# Patient Record
Sex: FEMALE | Race: WHITE | NOT HISPANIC OR LATINO | Employment: UNEMPLOYED | URBAN - METROPOLITAN AREA
[De-identification: names, ages, dates, MRNs, and addresses within clinical notes are randomized per-mention and may not be internally consistent; named-entity substitution may affect disease eponyms.]

---

## 2024-03-01 ENCOUNTER — APPOINTMENT (OUTPATIENT)
Dept: GENERAL RADIOLOGY | Facility: HOSPITAL | Age: 59
End: 2024-03-01
Payer: COMMERCIAL

## 2024-03-01 ENCOUNTER — HOSPITAL ENCOUNTER (EMERGENCY)
Facility: HOSPITAL | Age: 59
Discharge: HOME | End: 2024-03-01
Attending: EMERGENCY MEDICINE
Payer: COMMERCIAL

## 2024-03-01 VITALS
SYSTOLIC BLOOD PRESSURE: 125 MMHG | RESPIRATION RATE: 26 BRPM | HEIGHT: 66 IN | DIASTOLIC BLOOD PRESSURE: 82 MMHG | TEMPERATURE: 98.2 F | HEART RATE: 70 BPM | WEIGHT: 138 LBS | BODY MASS INDEX: 22.18 KG/M2 | OXYGEN SATURATION: 100 %

## 2024-03-01 DIAGNOSIS — S70.02XA CONTUSION OF LEFT HIP, INITIAL ENCOUNTER: ICD-10-CM

## 2024-03-01 DIAGNOSIS — S52.602A CLOSED FRACTURE DISTAL RADIUS AND ULNA, LEFT, INITIAL ENCOUNTER: Primary | ICD-10-CM

## 2024-03-01 DIAGNOSIS — S52.502A CLOSED FRACTURE DISTAL RADIUS AND ULNA, LEFT, INITIAL ENCOUNTER: Primary | ICD-10-CM

## 2024-03-01 PROCEDURE — 73110 X-RAY EXAM OF WRIST: CPT

## 2024-03-01 PROCEDURE — 73080 X-RAY EXAM OF ELBOW: CPT

## 2024-03-01 PROCEDURE — 73502 X-RAY EXAM HIP UNI 2-3 VIEWS: CPT

## 2024-03-01 PROCEDURE — 99285 EMERGENCY DEPT VISIT HI MDM: CPT

## 2024-03-01 PROCEDURE — 25010000002 PROPOFOL 10 MG/ML EMULSION: Performed by: EMERGENCY MEDICINE

## 2024-03-01 RX ORDER — HYDROCODONE BITARTRATE AND ACETAMINOPHEN 5; 325 MG/1; MG/1
1 TABLET ORAL ONCE
Status: COMPLETED | OUTPATIENT
Start: 2024-03-01 | End: 2024-03-01

## 2024-03-01 RX ORDER — PROPOFOL 10 MG/ML
150 VIAL (ML) INTRAVENOUS ONCE
Status: DISCONTINUED | OUTPATIENT
Start: 2024-03-01 | End: 2024-03-02 | Stop reason: HOSPADM

## 2024-03-01 RX ORDER — HYDROCODONE BITARTRATE AND ACETAMINOPHEN 5; 325 MG/1; MG/1
1 TABLET ORAL EVERY 6 HOURS PRN
Qty: 12 TABLET | Refills: 0 | Status: SHIPPED | OUTPATIENT
Start: 2024-03-01 | End: 2024-03-01 | Stop reason: SDUPTHER

## 2024-03-01 RX ORDER — PROPOFOL 10 MG/ML
VIAL (ML) INTRAVENOUS
Status: COMPLETED | OUTPATIENT
Start: 2024-03-01 | End: 2024-03-01

## 2024-03-01 RX ORDER — SODIUM CHLORIDE 0.9 % (FLUSH) 0.9 %
10 SYRINGE (ML) INJECTION AS NEEDED
Status: DISCONTINUED | OUTPATIENT
Start: 2024-03-01 | End: 2024-03-02 | Stop reason: HOSPADM

## 2024-03-01 RX ORDER — HYDROCODONE BITARTRATE AND ACETAMINOPHEN 5; 325 MG/1; MG/1
1 TABLET ORAL EVERY 6 HOURS PRN
Qty: 12 TABLET | Refills: 0 | Status: SHIPPED | OUTPATIENT
Start: 2024-03-01

## 2024-03-01 RX ADMIN — PROPOFOL 40 MG: 10 INJECTION, EMULSION INTRAVENOUS at 21:18

## 2024-03-01 RX ADMIN — HYDROCODONE BITARTRATE AND ACETAMINOPHEN 1 TABLET: 5; 325 TABLET ORAL at 22:12

## 2024-03-01 RX ADMIN — PROPOFOL 60 MG: 10 INJECTION, EMULSION INTRAVENOUS at 21:16

## 2024-03-02 NOTE — ED PROVIDER NOTES
Subjective   History of Present Illness  58-year-old female presents for evaluation of left wrist injury.  Just prior to coming to the emergency department this evening, the patient was walking her dog when she had a mechanical trip and fall and fell onto an outstretched left hand.  She felt immediate pain to her left wrist.  She suffered an obvious deformity to her left wrist as a result of the fall.  She is right-handed.  She is also complaining of mild pain to her left hip from the fall.  She is ambulatory.  No paresthesias.  She currently rates her pain in her wrist at 9 out of 10 in severity and notes that the pain is worse with attempted movement.  She notes radiation of pain to her left elbow region.  She did not hit her head or lose consciousness.  No neck pain.  She is not anticoagulated.      Review of Systems   Musculoskeletal:         Left wrist pain, left hip pain   All other systems reviewed and are negative.      No past medical history on file.    No Known Allergies    No past surgical history on file.    No family history on file.    Social History     Socioeconomic History    Marital status:            Objective   Physical Exam  Vitals and nursing note reviewed.   Constitutional:       General: She is not in acute distress.     Appearance: Normal appearance. She is well-developed. She is not diaphoretic.      Comments: Nontoxic-appearing female   HENT:      Head: Normocephalic and atraumatic.   Eyes:      Pupils: Pupils are equal, round, and reactive to light.   Neck:      Comments: No midline cervical spine tenderness noted, no step-off or deformity present  Cardiovascular:      Rate and Rhythm: Normal rate and regular rhythm.      Heart sounds: Normal heart sounds. No murmur heard.     No friction rub. No gallop.   Pulmonary:      Effort: Pulmonary effort is normal. No respiratory distress.      Breath sounds: Normal breath sounds. No wheezing or rales.   Abdominal:      General: Bowel  sounds are normal. There is no distension.      Palpations: Abdomen is soft. There is no mass.      Tenderness: There is no abdominal tenderness. There is no guarding or rebound.   Musculoskeletal:      Comments: Obvious, closed deformity noted to left wrist with soft tissue swelling noted    No shortening or rotation of left lower extremity when compared to the right on visual inspection, no pelvic instability present   Skin:     General: Skin is warm and dry.      Findings: No erythema or rash.   Neurological:      Mental Status: She is alert and oriented to person, place, and time.      Comments: Normal gait, neurovascularly intact distally in all fours with bounding distal pulses normal sensation noted   Psychiatric:         Mood and Affect: Mood normal.         Thought Content: Thought content normal.         Judgment: Judgment normal.         Procedural Sedation    Date/Time: 3/1/2024 11:44 PM    Performed by: Mark Syed MD  Authorized by: Mark Syed MD    Consent:     Consent obtained:  Verbal and written    Consent given by:  Patient    Risks discussed:  Allergic reaction, dysrhythmia, inadequate sedation, nausea, respiratory compromise necessitating ventilatory assistance and intubation, prolonged hypoxia resulting in organ damage and vomiting  Universal protocol:     Procedure explained and questions answered to patient or proxy's satisfaction: yes      Relevant documents present and verified: yes      Imaging studies available: yes      Site/side marked: yes      Immediately prior to procedure, a time out was called: yes      Patient identity confirmed:  Verbally with patient and arm band  Indications:     Procedure performed:  Fracture reduction    Procedure necessitating sedation performed by:  Physician performing sedation    Intended level of sedation:  Moderate  Pre-sedation assessment:     Time since last food or drink:  1700    ASA classification: class 2 - patient with mild  systemic disease      Mallampati score:  II - soft palate, uvula, fauces visible    Pre-sedation assessments completed and reviewed: airway patency, anesthesia/sedation history, cardiovascular function, hydration status, mental status, nausea/vomiting, pain level, respiratory function and temperature      Pre-sedation assessment completed:  3/1/2024 9:00 PM  Immediate pre-procedure details:     Reassessment: Patient reassessed immediately prior to procedure      Reviewed: vital signs, relevant labs/tests and NPO status      Verified: bag valve mask available, emergency equipment available, intubation equipment available, IV patency confirmed and oxygen available    Procedure details (see MAR for exact dosages):     Sedation start time:  3/1/2024 9:16 PM    Preoxygenation:  Nonrebreather mask    Sedation:  Propofol    Intra-procedure monitoring:  Blood pressure monitoring, cardiac monitor, continuous pulse oximetry, continuous capnometry, frequent LOC assessments and frequent vital sign checks    Intra-procedure events: none      Sedation end time:  3/1/2024 9:33 PM    Total sedation time (minutes):  17  Post-procedure details:     Post-sedation assessment completed:  3/1/2024 9:45 PM    Attendance: Constant attendance by certified staff until patient recovered      Recovery: Patient returned to pre-procedure baseline      Complications:  N/a    Post-sedation assessments completed and reviewed: airway patency, cardiovascular function, hydration status, mental status, nausea/vomiting, pain level and respiratory function      Specimens recovered:  None    Patient is stable for discharge or admission: yes      Procedure completion:  Tolerated well, no immediate complications  FX Dislocation    Date/Time: 3/1/2024 11:46 PM    Performed by: Mark Syed MD  Authorized by: Mark Syed MD    Consent:     Consent obtained:  Verbal and written    Consent given by:  Patient    Risks, benefits, and alternatives  were discussed: yes      Risks discussed:  Nerve damage, pain and vascular damage  Universal protocol:     Procedure explained and questions answered to patient or proxy's satisfaction: yes      Relevant documents present and verified: yes      Imaging studies available: yes      Site/side marked: yes      Immediately prior to procedure, a time out was called: yes      Patient identity confirmed:  Verbally with patient and arm band  Injury:     Injury location:  Forearm    Forearm injury location:  L forearm    Forearm fracture type: distal radius and ulnar styloid    Pre-procedure details:     Distal neurologic exam:  Normal    Distal perfusion: distal pulses strong and brisk capillary refill      Range of motion: reduced    Sedation:     Sedation type:  Moderate sedation  Anesthesia:     Anesthesia method:  None  Procedure details:     Manipulation performed: yes      X-ray confirmed reduction: yes      Immobilization:  Splint    Splint type:  Sugar tong    Supplies used:  Cotton padding and fiberglass    Attestation: Splint applied and adjusted personally by me    Post-procedure details:     Distal perfusion: distal pulses strong and brisk capillary refill      Range of motion: not applicable      Procedure completion:  Tolerated well, no immediate complications             ED Course  ED Course as of 03/01/24 2342   Fri Mar 01, 2024   2210 58-year-old female presents for evaluation of left wrist injury.  Just prior to coming to the emergency department this evening, the patient was walking her dog when she had a mechanical trip and fall and fell onto an outstretched left hand.  She felt immediate pain to her left wrist.  She suffered an obvious deformity to her left wrist as a result of the fall.  She is right-handed.  She is also complaining of mild pain to her left hip.  She is ambulatory.  No paresthesias.  On arrival to the ED, the patient has an obvious closed deformity to her left wrist.  Range of motion is  limited secondary to pain.  She is ambulatory.  Left upper extremity is neurovascularly intact distally with bounding distal pulses normal sensation noted.  Pain control provided. [DD]   2211 I personally and independently viewed the patient's x-ray images myself, and I am in agreement with the radiologist's reading for final interpretation--particular regarding closed distal radius and ulna fractures on the left.  No hip fracture noted. [DD]   2211 After obtaining informed consent, and using propofol for procedural sedation, the patient's wrist fracture was reduced without complication.  She tolerated the procedure well.  Neurovascularly intact following splint placement.  Post reduction x-rays obtained with improved alignment. [DD]   2212 I feel that the patient can be managed on an outpatient basis at this point.  Short course of Norco given for pain control.  I referred the patient Dr. Byrd of orthopedics [DD]   2212  and she will follow-up within the next 72 hours.  Agreeable with plan and given appropriate strict return precautions. [DD]      ED Course User Index  [DD] Mark Syed MD                                 No results found for this or any previous visit (from the past 24 hour(s)).  Note: In addition to lab results from this visit, the labs listed above may include labs taken at another facility or during a different encounter within the last 24 hours. Please correlate lab times with ED admission and discharge times for further clarification of the services performed during this visit.    XR Wrist 3+ View Left   Final Result   Impression:   Slight improved alignment of the comminuted distal radius fracture status post reduction.         Electronically Signed: Randy Comer MD     3/1/2024 9:41 PM EST     Workstation ID: [id number]      XR Elbow 3+ View Left   Final Result   Impression:   No obvious proximal fracture or elbow joint malalignment, with poor visualization given overlying splint  "material.         Electronically Signed: Randy Comer MD     3/1/2024 9:40 PM EST     Workstation ID: [id number]      XR Hip With or Without Pelvis 2 - 3 View Left   Final Result   Impression:   No acute abnormality of the left hip.         Electronically Signed: Randy Comer MD     3/1/2024 8:01 PM EST     Workstation ID: [id number]      XR Wrist 3+ View Left   Final Result   Impression:   Comminuted moderately displaced fracture of the distal radius with radiocarpal intra-articular extension as above.    Mildly displaced ulnar styloid fracture.         Electronically Signed: Randy Comer MD     3/1/2024 8:00 PM EST     Workstation ID: [id number]        Vitals:    03/01/24 1910 03/01/24 2116 03/01/24 2135   BP: 161/99 134/68 125/82   BP Location: Right arm     Patient Position: Sitting     Pulse: 79 79 70   Resp: 18 26    Temp: 98.2 °F (36.8 °C)     TempSrc: Oral     SpO2: 100% 98% 100%   Weight: 62.6 kg (138 lb)     Height: 167.6 cm (66\")       Medications   sodium chloride 0.9 % flush 10 mL (has no administration in time range)   Propofol (DIPRIVAN) injection 150 mg (has no administration in time range)   Propofol (DIPRIVAN) injection (40 mg Intravenous Given 3/1/24 2118)   HYDROcodone-acetaminophen (NORCO) 5-325 MG per tablet 1 tablet (1 tablet Oral Given 3/1/24 2212)     ECG/EMG Results (last 24 hours)       ** No results found for the last 24 hours. **          No orders to display           AMILCAR reviewed by Mark Syed MD       Medical Decision Making  Problems Addressed:  Closed fracture distal radius and ulna, left, initial encounter: complicated acute illness or injury    Amount and/or Complexity of Data Reviewed  Radiology: ordered.    Risk  Prescription drug management.        Final diagnoses:   Closed fracture distal radius and ulna, left, initial encounter       ED Disposition  ED Disposition       ED Disposition   Discharge    Condition   Stable    Comment   --               David Byrd, " MD  [address]  [place] [address]  [phone]    In 3 days           Medication List        New Prescriptions      HYDROcodone-acetaminophen 5-325 MG per tablet  Commonly known as: NORCO  Take 1 tablet by mouth Every 6 (Six) Hours As Needed for Moderate Pain.               Where to Get Your Medications        These medications were sent to [facility] - [place], [place] - [address] - [phone] [facility] [phone] FX  [address], [place] [id number]      Phone: [phone]   HYDROcodone-acetaminophen 5-325 MG per tablet            Mark Syed MD  03/01/24 8214

## 2024-03-04 ENCOUNTER — TELEPHONE (OUTPATIENT)
Age: 59
End: 2024-03-04

## 2024-03-04 ENCOUNTER — OFFICE VISIT (OUTPATIENT)
Age: 59
End: 2024-03-04
Payer: COMMERCIAL

## 2024-03-04 VITALS
WEIGHT: 138 LBS | SYSTOLIC BLOOD PRESSURE: 115 MMHG | HEIGHT: 66 IN | BODY MASS INDEX: 22.18 KG/M2 | DIASTOLIC BLOOD PRESSURE: 95 MMHG

## 2024-03-04 DIAGNOSIS — S52.572A OTHER CLOSED INTRA-ARTICULAR FRACTURE OF DISTAL END OF LEFT RADIUS, INITIAL ENCOUNTER: Primary | ICD-10-CM

## 2024-03-04 PROCEDURE — 99204 OFFICE O/P NEW MOD 45 MIN: CPT | Performed by: PLASTIC SURGERY

## 2024-03-04 RX ORDER — DIPHENOXYLATE HYDROCHLORIDE AND ATROPINE SULFATE 2.5; .025 MG/1; MG/1
TABLET ORAL DAILY
COMMUNITY

## 2024-03-04 RX ORDER — CLONAZEPAM 1 MG/1
TABLET ORAL NIGHTLY
COMMUNITY
Start: 2014-11-14

## 2024-03-04 RX ORDER — FLUTICASONE PROPIONATE 50 MCG
SPRAY, SUSPENSION (ML) NASAL
COMMUNITY
Start: 2024-01-04

## 2024-03-04 RX ORDER — MELATONIN
1000 DAILY
COMMUNITY

## 2024-03-04 RX ORDER — VALACYCLOVIR HYDROCHLORIDE 1 G/1
TABLET, FILM COATED ORAL AS NEEDED
COMMUNITY
Start: 2024-01-04

## 2024-03-04 RX ORDER — CHLORAL HYDRATE 500 MG
CAPSULE ORAL
COMMUNITY

## 2024-03-04 RX ORDER — LEVOTHYROXINE SODIUM 0.05 MG/1
TABLET ORAL DAILY
COMMUNITY
Start: 2021-02-21

## 2024-03-04 NOTE — PROGRESS NOTES
[facility] Orthopedic     Office Visit       Date: 03/04/2024   Patient Name: Mirlande Wiggins  MRN: [id number]  YOB: 1965    Referring Physician: Referring, Self     Chief Complaint:   Chief Complaint   Patient presents with    Left Wrist - Pain       History of Present Illness:   Mirlande Wiggins is a 58 y.o. female  R-hand-dominant presents with a left distal radius fracture sustained on 3/1/2024 after a fall from standing while walking her dog.     Patient initially presented to the emergency department underwent closed reduction and was placed in a sugar-tong cast.  Reports that her pain has been stable since then.  Denies numbness and tingling.  The patient lives with her .  Works as an artist and pianist.  Denies smoking.         Subjective   Review of Systems:   Review of Systems   Constitutional:  Negative for chills, fever, unexpected weight gain and unexpected weight loss.   HENT:  Negative for congestion, postnasal drip and rhinorrhea.    Eyes:  Negative for blurred vision.   Respiratory:  Negative for shortness of breath.    Cardiovascular:  Negative for leg swelling.   Gastrointestinal:  Negative for abdominal pain, nausea and vomiting.   Genitourinary:  Negative for difficulty urinating.   Musculoskeletal:  Positive for arthralgias. Negative for gait problem, joint swelling and myalgias.   Skin:  Negative for skin lesions and wound.   Neurological:  Negative for dizziness, weakness, light-headedness and numbness.   Hematological:  Does not bruise/bleed easily.   Psychiatric/Behavioral:  Negative for depressed mood.    All other systems reviewed and are negative.       Pertinent review of systems per HPI.     I reviewed the patient's chief complaint, history of present illness, review of systems, past medical history, surgical history, family history, social history, medications and allergy list in the EMR on  "03/04/2024 and agree with the findings above.    Objective    Vital Signs:   Vitals:    03/04/24 1345   BP: 115/95   Weight: 62.6 kg (138 lb)   Height: 167.6 cm (65.98\")     BMI: BMI is within normal parameters. No other follow-up for BMI required.       General Appearance: No acute distress. Alert and oriented.     Chest:  Non-labored breathing on room air. Regular rate and rhythm.    Left Upper Extremity Exam:    No palpable masses or visible lesions  Fingers are edematous, ecchymotic warm, well-perfused with appropriate capillary refill.    Sensation intact to light touch in median, radial and ulnar nerve distributions.    Non-tender except for in the areas highlighted below    Sugar-tong splint in place.  Patient able to grossly wiggle her fingers.    Imaging/Studies:   Imaging Results (Last 24 Hours)       ** No results found for the last 24 hours. **            X-ray of the left wrist from 3/1/2024 was independently reviewed and interpreted by myself and demonstrate an intra-articular left distal radius fracture with significant combination and an intra-articular step-off    Procedures:  Procedures    Quality Measures:   ACP:   ACP discussion was declined by the patient, Patient does not have an advance directive, declines further assistance.    Tobacco:   Mirlande Wiggins  reports that she has never smoked. She has never used smokeless tobacco.      Assessment / Plan    Assessment/Plan:     There are no diagnoses linked to this encounter.     Mirlande Wigginsis a 58 y.o. female who presents with:      ICD-10-CM ICD-9-CM   1. Other closed intra-articular fracture of distal end of left radius, initial encounter  S52.572A 813.42         Regarding the patient's distal radius fracture:    Given the patient's age, activity level, and the fracture pattern I would recommend operative management with open reduction and internal fixation of the fracture.  Will plan to get a CT of the left wrist stat and then plan on " surgery within 1 week of the fracture.  The risks and benefits of surgery were discussed with the patient including bleeding, scarring, infection, nonunion, malunion, extensor tendon rupture, and the need for further surgery including possible hardware removal.  Patient agrees with the plan for surgery and we will proceed with booking her for outpatient surgery as soon as possible.    Consent-left distal radius open reduction internal fixation    The risks and benefits of the procedure were discussed with the patient and or appropriate guardian, which include but are not limited to the risk of bleeding, infection, neurovascular damage, post-operative stiffness, recurrence, tendon and/or ligament retears, recurrent instability, continued pain, arthritic pain, need for further revision surgeries in the future, deep venous thrombosis, and general risks from anesthesia. We also discussed the post-operative rehabilitation, the need for physical therapy, and the overall expected outcomes from the procedure. We also discussed the possible use of biologics including allograft. We allowed proper time and answered the patient's questions regarding the procedure. The patient expressed understanding. Knowing what the risks are and what the conservative treatment is, the patient elected to forgo any further conservative treatment options and proceed with the surgical intervention. A surgical consent was signed.            Follow Up:   Return for Follow Up after Post Op.        Brent Horan MD  [facility] Hand and Upper Extremity Surgeon

## 2024-03-05 ENCOUNTER — DOCUMENTATION (OUTPATIENT)
Age: 59
End: 2024-03-05

## 2024-03-05 ENCOUNTER — HOSPITAL ENCOUNTER (OUTPATIENT)
Dept: CT IMAGING | Facility: HOSPITAL | Age: 59
Discharge: HOME | End: 2024-03-05
Admitting: PLASTIC SURGERY
Payer: COMMERCIAL

## 2024-03-05 DIAGNOSIS — S52.572A OTHER CLOSED INTRA-ARTICULAR FRACTURE OF DISTAL END OF LEFT RADIUS, INITIAL ENCOUNTER: ICD-10-CM

## 2024-03-05 DIAGNOSIS — S52.572A OTHER CLOSED INTRA-ARTICULAR FRACTURE OF DISTAL END OF LEFT RADIUS, INITIAL ENCOUNTER: Primary | ICD-10-CM

## 2024-03-05 PROCEDURE — 73200 CT UPPER EXTREMITY W/O DYE: CPT

## 2024-03-05 RX ORDER — OXYCODONE HYDROCHLORIDE 5 MG/1
5 TABLET ORAL EVERY 6 HOURS PRN
Qty: 20 TABLET | Refills: 0 | Status: SHIPPED | OUTPATIENT
Start: 2024-03-05 | End: 2024-03-11 | Stop reason: SDUPTHER

## 2024-03-05 NOTE — PROGRESS NOTES
DATE OF PROCEDURE: 03/05/2024    LOCATION: [facility]      PROCEDURES PERFORMED:    1. Open reduction internal fixation of left distal radius fracture >3 parts CPT 24766/75382/97924      SURGEON: Brent Horan MD      ASSISTANTS:    1. None    * Surgery not found *      ANESTHESIA: Axillary block with General    PREOPERATIVE DIAGNOSES:  1. Left comminuted intra-articular distal radius fracture     POSTOPERATIVE DIAGNOSES:    Same     ESTIMATED BLOOD LOSS: 10 mL.    SPECIMENS: none    IMPLANTS: [employer] Geminus 3-hole small distal radius volar locking plate    COMPLICATIONS: None     INDICATIONS:  Mirlande Wiggins is a 58 y.o. female who initially presented with comminuted intra-articular left distal radius fracture. Given the fracture pattern and patient activity level the decision was made to proceed with open reduction and internal fixaiton.  The risks, benefits, alternatives and potential complications of surgery were discussed with the patient and they agreed to proceed with surgery. A surgical informed consent was signed prior to the procedure.     DESCRIPTION OF PROCEDURE:  The patient was greeted in the pre-operative holding area and the surgical site was marked and consent confirmed prior to bringing the patient to the operating room.  The patient then received a left axillary block that was placed by the anesthesia team.  The patient was then taken to the operating room and a timeout was performed including the patient's name, procedure and antibiotic administration prior to the patient receiving general anesthesia.  The patient was positioned supine on the operative room table and a non-sterile tourniquet was applied to the left upper extremity and it was then prepped and draped in the usual sterile fashion.  The patient received the appropriate antibiotics within 1 hour of skin incision.     The left upper extremity was then exsanguinated using an Esmarch and the  tourniquet set to 250 mmHg.  An incision was made directly over the location of the FCR tendon with a hockey-stick extension distally.  Dissection was then carried through the superficial sheath of the FCR tendon and it was retracted ulnarly.  The floor of the FCR sheath was then incised sharply, taking care to protect the superficial branch of the radial artery.  Blunt dissection was then used to sweep the FPL and contents of the carpal tunnel away from the pronator quadratus and then retracted ulnarly.  The pronator quadratus was then sharply incised from its radial and distal aspect using a 15 blade scalpel and periosteal dissection was used to expose distal radius metaphysis and the fracture.     After the fracture was identified 10 pounds of traction were applied to the index and middle finger to help restore length and radial inclination.  The fracture was then debrided of any early callus and gently mobilized using a rongeur and [place]. ***  Reduction was then confirmed using fluoroscopy.     A  [employer] Geminus 3-hole small distal radius volar locking plate was then selected for fixation.  This was applied and then temporarily fixated distally using 2 0.62 K wires in the designated holes.  Fluoroscopy was then used to confirm and adjust the plate position until we were happy with both the position and the reduction.  Next a bicortical nonlocking screw was placed in the oblong hole.  The distal locking holes were then filled with locking screws.  The remaining 2 most proximal holes were then drilled and fixated with nonlocking screws.     After complete fixation of the plate we loaded the joint with extension, flexion, and axial loading under fluoroscopy to ensure that our construct was stable.  DRUJ was then stressed and both pronation and supination and found to be stable.     The tourniquet was then let down and hemostasis achieved using bipolar electrocautery.  The wound was irrigated with copious  amounts of normal saline solution.  The pronator was then redraped over the plate and skin closed in layered fashion using 3-0 Monocryl in deep dermal fashion followed by 4-0 Monocryl in running subcuticular fashion.     A sterile dressing was then applied with Steri-Strips, 4 x 4's, Webril and a volar splint.     At the end of the procedure the patient was awoken from anesthesia and transferred to the PACU in stable condition.  I participated in all parts of the case.    POSTOPERATIVE PLAN:  Non-weightbearing of the operative extremity.  2.  Over the counter Tylenol and/or Advil/Aleve/Motrin for pain control. A narcotic prescription was also provided as needed for breakthrough pain.  3.  Dressing and splint to remain in place until follow-up.  4.  X-rays out of splint at follow-up   5.  Follow up in 10-14 days as scheduled.

## 2024-03-06 ENCOUNTER — DOCUMENTATION (OUTPATIENT)
Dept: ORTHOPEDIC SURGERY | Facility: CLINIC | Age: 59
End: 2024-03-06

## 2024-03-06 NOTE — PROGRESS NOTES
Left Wrist Intra-operative Fluoroscopy    Date: 03/06/2024    Location: [facility]    Indication: Intra-operative Fluoroscopy    Views:  AP, Lateral, and Oblique     Comparison: Pre-operative    Findings:  Patient is status post ORIF of the distal radius.  Alignment is improved.  The hardware is intact.    Normal soft tissues.  Normal joint spaces      Impression:  Fracture of the left distal radius s/p ORIF with improved alignment and intact hardware.

## 2024-03-06 NOTE — PROGRESS NOTES
DATE OF PROCEDURE: 03/06/2024    LOCATION: [facility]     PROCEDURES PERFORMED:    1.  Left Open reduction internal fixation of intra-articular distal radius fracture > 3 pieces CPT 84741      SURGEON: Brent Horan MD      ASSISTANTS:    1. None        * Surgery not found *      ANESTHESIA: Axillary block with LMA    PREOPERATIVE DIAGNOSES:  1.  Left intra-articular distal radius fracture     POSTOPERATIVE DIAGNOSES:    Same     ESTIMATED BLOOD LOSS: 10 mL.    SPECIMENS: none    IMPLANTS: [employer] Geminus 4 hole small distal radius volar locking plate    COMPLICATIONS: None     INDICATIONS:  Mirlande Wiggins is a 58 y.o. female who initially presented with comminuted intra-articular fracture of the left distal radius.  Given the fracture pattern and the patient activity level I recommended reduction and internal fixation..  The risks, benefits, alternatives and potential complications of surgery were discussed with the patient and they agreed to proceed with surgery. A surgical informed consent was signed prior to the procedure.     DESCRIPTION OF PROCEDURE:  The patient was greeted in the pre-operative holding area and the surgical site was marked and consent confirmed prior to bringing the patient to the operating room.  The patient then received aleft  axillary block that was placed by the anesthesia team.  The patient was then taken to the operating room and a timeout was performed including the patient's name, procedure and antibiotic administration prior to the patient receiving general anesthesia.  The patient was positioned supine on the operative room table and a non-sterile tourniquet was applied to the left upper extremity and it was then prepped and draped in the usual sterile fashion.  The patient received the appropriate antibiotics within 1 hour of skin incision.     The left upper extremity was then exsanguinated using an Esmarch and the tourniquet set to 250 mmHg.   An incision was made directly over the location of the FCR tendon with a hockey-stick extension distally.  Dissection was then carried through the superficial sheath of the FCR tendon and it was retracted ulnarly.  The floor of the FCR sheath was then incised sharply, taking care to protect the superficial branch of the radial artery.  Blunt dissection was then used to sweep the FPL and contents of the carpal tunnel away from the pronator quadratus and then retracted ulnarly.  The pronator quadratus was then sharply incised from its radial and distal aspect using a 15 blade scalpel and periosteal dissection was used to expose distal radius metaphysis and the fracture.  Laterally blunt dissection was used to identify and isolate the brachioradialis tendon at its distal insertion.  A brachioradialis tenotomy was sharply performed.  The lateral septum was then released to allow identification of the first dorsal compartment.     After the fracture was identified and exposed 10 pounds of traction was applied to the index and middle finger to help restore length and radial inclination.  The fracture was then debrided of any early callus and gently mobilized using a rongeur and Brohard.  Gentle flexing on the distal piece was then applied and the comminuted volar fracture was keyed into place to facilitate the reduction.  Volarly directed dorsal pressure was also applied to the dorsal ulnar corner to ensure that the intra-articular coronal split was properly reduced.  Reduction was then confirmed using fluoroscopy.     A  skeletal [employer] Geminus 4 hole small distal radius volar locking plate was then selected for fixation.  This was applied and then temporarily fixated distally using 2.62 K wires in the designated holes.  Again volarly directed dorsal pressure was applied to the dorsal ulnar corner to facilitate closure of the articular coronal split.  Fluoroscopy was then used to confirm and adjust the plate position  until we were happy with both the position and the reduction.  Next a bicortical nonlocking screw was placed in the oblong hole.  The distal locking holes were then filled with locking screws.  The remaining 2 most proximal holes were then drilled and fixated with nonlocking screws.     After complete fixation of the plate we loaded the joint with extension, flexion, and axial loading under fluoroscopy to ensure that our construct was stable.  DRUJ was then stressed and both pronation and supination and found to be stable.     The tourniquet was then let down and hemostasis achieved using bipolar electrocautery.  The wound was irrigated with copious amounts of normal saline solution.  The pronator was then redraped over the plate and skin closed in layered fashion using 3-0 Monocryl in deep dermal fashion followed by 4-0 Monocryl in running subcuticular fashion.     A sterile dressing was then applied with Steri-Strips, 4 x 4's, Webril and a volar splint.     At the end of the procedure the patient was awoken from anesthesia and transferred to the PACU in stable condition.  I participated in all parts of the case.    POSTOPERATIVE PLAN:  Non-weightbearing of the operative extremity.  2.  Over the counter Tylenol and/or Advil/Aleve/Motrin for pain control. A narcotic prescription was also provided as needed for breakthrough pain.  3.  Dressing and splint to remain in place until follow-up.  4.  X-rays out of splint at follow-up   5.  Follow up in 10-14 days as scheduled.

## 2024-03-11 ENCOUNTER — TELEPHONE (OUTPATIENT)
Dept: ORTHOPEDIC SURGERY | Facility: CLINIC | Age: 59
End: 2024-03-11
Payer: COMMERCIAL

## 2024-03-11 ENCOUNTER — HOSPITAL ENCOUNTER (OUTPATIENT)
Dept: GENERAL RADIOLOGY | Facility: HOSPITAL | Age: 59
Discharge: HOME | End: 2024-03-11
Admitting: PLASTIC SURGERY
Payer: COMMERCIAL

## 2024-03-11 ENCOUNTER — OFFICE VISIT (OUTPATIENT)
Age: 59
End: 2024-03-11
Payer: COMMERCIAL

## 2024-03-11 VITALS — TEMPERATURE: 98.1 F

## 2024-03-11 DIAGNOSIS — Z98.890 STATUS POST OPEN REDUCTION AND INTERNAL FIXATION (ORIF) OF FRACTURE: ICD-10-CM

## 2024-03-11 DIAGNOSIS — Z98.890 STATUS POST OPEN REDUCTION AND INTERNAL FIXATION (ORIF) OF FRACTURE: Primary | ICD-10-CM

## 2024-03-11 DIAGNOSIS — M25.522 LEFT ELBOW PAIN: ICD-10-CM

## 2024-03-11 DIAGNOSIS — S52.572A OTHER CLOSED INTRA-ARTICULAR FRACTURE OF DISTAL END OF LEFT RADIUS, INITIAL ENCOUNTER: Primary | ICD-10-CM

## 2024-03-11 DIAGNOSIS — S52.572A OTHER CLOSED INTRA-ARTICULAR FRACTURE OF DISTAL END OF LEFT RADIUS, INITIAL ENCOUNTER: ICD-10-CM

## 2024-03-11 DIAGNOSIS — Z87.81 STATUS POST OPEN REDUCTION AND INTERNAL FIXATION (ORIF) OF FRACTURE: Primary | ICD-10-CM

## 2024-03-11 DIAGNOSIS — Z87.81 STATUS POST OPEN REDUCTION AND INTERNAL FIXATION (ORIF) OF FRACTURE: ICD-10-CM

## 2024-03-11 PROCEDURE — 99024 POSTOP FOLLOW-UP VISIT: CPT | Performed by: PLASTIC SURGERY

## 2024-03-11 PROCEDURE — 73080 X-RAY EXAM OF ELBOW: CPT

## 2024-03-11 PROCEDURE — 73110 X-RAY EXAM OF WRIST: CPT

## 2024-03-11 RX ORDER — SENNOSIDES A AND B 8.6 MG/1
1 TABLET, FILM COATED ORAL DAILY
COMMUNITY

## 2024-03-11 RX ORDER — DOCUSATE SODIUM 100 MG/1
100 CAPSULE, LIQUID FILLED ORAL 2 TIMES DAILY PRN
Qty: 62 CAPSULE | Refills: 0 | Status: SHIPPED | OUTPATIENT
Start: 2024-03-11

## 2024-03-11 RX ORDER — OXYCODONE HYDROCHLORIDE 5 MG/1
5 TABLET ORAL EVERY 6 HOURS PRN
Qty: 15 TABLET | Refills: 0 | Status: SHIPPED | OUTPATIENT
Start: 2024-03-11

## 2024-03-11 NOTE — PROGRESS NOTES
[facility]     Office Visit       Date: 03/11/2024   Patient Name: Mirlande Wiggins  MRN: [id number]  YOB: 1965    Referring Physician: No ref. provider found     Chief Complaint:   Chief Complaint   Patient presents with    Post-op     5 days status post Left Open reduction internal fixation of intra-articular distal radius fracture   - 3/6/24       History of Present Illness:   Mirlande Wiggins is a 58 y.o. female presents for follow-up status post left distal radius ORIF on 3/6/2024.  She reports that her pain has been well-controlled since surgery however call the office today because of progressive numbness and tingling in her left thumb and index finger.  Patient reports that after her block wore off on 3/7/2024 she had no numbness or tingling in her left hand.  She went on to develop tingling in her left thumb on the afternoon of 3/8/2024.  She reports that this is progressed over the weekend to the point where she now has numbness and tingling in her thumb and index finger.  She reports that her pain has been stable.  She also reports left elbow pain but has not been using her elbow much.  No other new concerns.      Subjective   Review of Systems:   Review of Systems     Pertinent review of systems per HPI.     I reviewed the patient's chief complaint, history of present illness, review of systems, past medical history, surgical history, family history, social history, medications and allergy list in the EMR on 03/11/2024 and agree with the findings above.    Objective    Vital Signs:   Vitals:    03/11/24 1334   Temp: 98.1 °F (36.7 °C)     BMI: BMI is within normal parameters. No other follow-up for BMI required.       General Appearance: No acute distress. Alert and oriented.     Chest:  Non-labored breathing on room air. Regular rate and rhythm.    Left upper Extremity Exam:    No palpable masses or visible  lesions  Fingers are warm, well-perfused with appropriate capillary refill.  Palpable radial pulse.    Sensation intact to light touch in median, radial and ulnar nerve distributions.    Motor- Fires FPL, ulnar intrinsics, EPL/EDC w/ full active and passive range of motion. Strength intact.    Non-tender except for in the areas highlighted below    Left wrist and forearm edematous, ecchymotic but compartments are soft and compressible.    Patient with full passive and active flexion extension at the elbow.  Minimally tender to palpation over the lateral radiocapitellar joint..  Nontender over the lateral condyle    Sensation to light touch diminished over the left thumb and index finger.  No 2-point discrimination in the thumb or index finger.  2 point discrimination 5 mm the left middle finger.    Imaging/Studies:   Imaging Results (Last 24 Hours)       ** No results found for the last 24 hours. **            X-ray of the left wrist and elbow from 3/11/2024 were both independently reviewed and interpreted by myself.  She is status post ORIF of left distal radius fracture with improved alignment and stable intact hardware.  No evidence of bony abnormality of the left elbow.    Procedures:  Procedures    Quality Measures:   ACP:   ACP discussion was declined by the patient, Patient does not have an advance directive, declines further assistance.    Tobacco:   Mirlande Wiggins  reports that she has never smoked. She has never used smokeless tobacco.      Assessment / Plan    Assessment/Plan:     Diagnoses and all orders for this visit:    Other closed intra-articular fracture of distal end of left radius, initial encounter  -     External Facility Surgical/Procedural Request; Future    Left elbow pain  -     XR Elbow 3+ View Left; Future    Other orders  -     senna 8.6 MG tablet; Take 1 tablet by mouth Daily.         Mirlande Wigginsis a 58 y.o. female who presents with:      ICD-10-CM ICD-9-CM   1. Other closed  intra-articular fracture of distal end of left radius, initial encounter  S52.572A 813.42   2. Left elbow pain  M25.522 719.42       Patient presents status post ORIF of left distal radius fracture on 3/6/2024 now with concerns of acute left carpal tunnel syndrome.  She had no evidence of preoperative carpal tunnel syndrome nor did she develop symptoms until postoperative day 2.  She has no evidence of expanding hematoma or compartment syndrome on exam and her fracture remains in stable alignment with intact hardware however she has loss of 2 point sensation that has been increasing rather than decreasing over the weekend.  Discussed the pathophysiology of carpal tunnel syndrome after distal radius fracture with the patient as well as the options for treatment.  Given her acutely worsening symptoms recommend urgent left carpal tunnel release.  The patient and her  understand and agree to proceeding.    Consent-left carpal tunnel release    The risks and benefits of the procedure were discussed with the patient and or appropriate guardian, which include but are not limited to the risk of bleeding, infection, neurovascular damage, post-operative stiffness, recurrence, tendon and/or ligament retears, recurrent instability, continued pain, arthritic pain, need for further revision surgeries in the future, deep venous thrombosis, and general risks from anesthesia. We also discussed the post-operative rehabilitation, the need for physical therapy, and the overall expected outcomes from the procedure. We also discussed the possible use of biologics including allograft. We allowed proper time and answered the patient's questions regarding the procedure. The patient expressed understanding. Knowing what the risks are and what the conservative treatment is, the patient elected to forgo any further conservative treatment options and proceed with the surgical intervention. A surgical consent was signed.      Follow Up:    Return for Follow Up after Post Op.        Brent Horan MD  [facility]

## 2024-03-11 NOTE — TELEPHONE ENCOUNTER
Caller: Mirlande Wiggins    Relationship: Self    Best call back number: [phone]    Requested Prescriptions: OXYCODONE 5 MG  Requested Prescriptions      No prescriptions requested or ordered in this encounter        Pharmacy where request should be sent:  [facility] [phone]    Last office visit with prescribing clinician: 3/4/2024     Next office visit with prescribing clinician: 3/18/2024     Does the patient have less than a 3 day supply:  [x] Yes  [] No

## 2024-03-12 ENCOUNTER — DOCUMENTATION (OUTPATIENT)
Age: 59
End: 2024-03-12
Payer: COMMERCIAL

## 2024-03-12 NOTE — PROGRESS NOTES
DATE OF PROCEDURE: 03/12/2024    LOCATION: [facility]     PROCEDURES PERFORMED:    1. Left open carpal tunnel release CPT 96419    SURGEON: Brent Horan MD      ASSISTANTS:    1. None    * Surgery not found *      ANESTHESIA: Local    PREOPERATIVE DIAGNOSES:  1. Left carpal tunnel syndrome     POSTOPERATIVE DIAGNOSES:    Same     ESTIMATED BLOOD LOSS: 5 mL.    OPERATIVE TIME: 10 minutes    SPECIMENS: none    IMPLANTS: none    COMPLICATIONS: None     INDICATIONS:  Mirlande Wiggins is a 58 y.o. female who initially presented with left distal radius fracture. She underwent ORIF of left distal radius fracture on 3/6/24. Her post-operative course was complicated by the development of acute carpal tunnel syndrome symptoms on post op day 2. Given this history and her exam with no 2 point discrimination of her thumb or index finger, the decision was made to perform left carpal tunnel release.  The risks, benefits, alternatives and potential complications of surgery were discussed with the patient including but not limited to bleeding scarring, infection, recurrence, stiffness, damage to surrounding structures and postoperative pain.  The patient understands the risks and agreed to proceed with surgery.  A surgical informed consent was signed prior to the procedure.      DESCRIPTION OF PROCEDURE:  The patient was greeted in the pre-operative holding area and the surgical site was marked and consent confirmed prior to bringing the patient to the operating room.   In the pre-operative holding area a combination of 1% lidocaine with epinephrine and 0.5% Marcaine was injected at the site of the incision for local anesthesia. The patient was then taken to the operating room and a timeout was performed including the patient's name, procedure and antibiotic administration .  The patient was positioned supine on the operative room table and a non-sterile tourniquet was applied to the left upper  extremity and it was then prepped and draped in the usual sterile fashion.   No antibiotics were given.     The upper extremity was exsanguinated and tourniquet set to 225 mmHg. A 3 cm incision was made over the palm on the ulnar border the anticipated location of the transverse carpal ligament.  Superficial palmar fascia was then incised sharply.  Retractors were placed to allow visualization of the transverse fibers of the transverse carpal ligament.  This was then carefully incised using a 15 blade.  Proximally the transverse carpal ligament and antebrachial fascia were incised under direct visualization using careful retraction and a tenotomy scissor. There was bulging of the carpal tunnel contents upon release but no hematoma present in the carpal tunnel. The nerve appeared healthy and was intact. Wound was then irrigated with copious amounts normal saline solution.  Incision was closed with 4-0 nylon in horizontal mattress fashion.  A volar splint was applied.     At the end of the procedure the patient was awoken from anesthesia and transferred to the PACU in stable condition.  I participated in all parts of the case.    POSTOPERATIVE PLAN:  No lifting greater than 5 pounds with the operative extremity.  2.  Over the counter Tylenol and/or Advil/Aleve/Motrin for pain control.   3.  Dressings may be removed in 3 days and replaced with a dry daily dressing.  4.  Follow up in 10-14 days as scheduled.

## 2024-03-13 ENCOUNTER — TELEPHONE (OUTPATIENT)
Age: 59
End: 2024-03-13
Payer: COMMERCIAL

## 2024-03-13 NOTE — TELEPHONE ENCOUNTER
Marline, Brent Jade MD  You1 minute ago (1:33 PM)     I would not recommend any injection of steroid or neurobion. She has what is called neuropraxia, but the nerve is overall intact and now decompressed. I expect her symptoms to improve gradually but she will have to be patient for things to recover. There is nothing else I would recommend in the meantime.       I spoke with the patient to let her know what  stated above. She verbalized understanding and will continue to wait for some relief. She stated her hand was a little swollen and still have the numbness I asked if she was still icing and elevating she stated yes. I let her know to continue the ice and elevation and the swelling should decrease. I let her know the numbness will get better over time it just unfortunately takes some time to get back to normal. The patient verbalized understanding.       Cecelia Tenorio

## 2024-03-13 NOTE — TELEPHONE ENCOUNTER
Brent Horan MD  YouJust now (1:09 PM)     It make take several days for her to get relief. This is not unusual.      You  Brent Horan MD8 minutes ago (1:01 PM)     Please advise.    Cecelia Tenorio       I spoke with the patient she states she is still having numbness in her hand. I let her know what  stated it is normal to feel like this several days after surgery.     She wanted to know if there was any thing she could do as a conservative treatment other than take the oral medication given to her after surgery due to her nerve damage. Patient wants an intramuscular injectable to help with her nerve damage she was wanting to know if we would prescribe her one of these or how she would go about getting one of these medications Dexamethasone, Meurobiorn, or alpha-lipoic acid. I let her know she would probably need to reach out to her PCP for that but she stated she wanted us to ask  first to see what he thinks.      please advise.     Cecelia Tenorio

## 2024-03-13 NOTE — TELEPHONE ENCOUNTER
CLINICAL, REACHED  ADVISED TO SEND TE         Hub staff attempted to follow warm transfer process and was unsuccessful     Caller: CHRISTY GARINCA     Relationship to patient: PATIENT     Best call back number: [phone]    Patient is needing: Surgery - Left Open CTR 03/13/2024    PATIENT HAS CONCERNS , SHE DOESN'T FEEL ANY RELIEF AND WOULD LIKE TO DISCUSS

## 2024-03-18 ENCOUNTER — HOSPITAL ENCOUNTER (OUTPATIENT)
Dept: GENERAL RADIOLOGY | Facility: HOSPITAL | Age: 59
Discharge: HOME | End: 2024-03-18
Admitting: PLASTIC SURGERY
Payer: COMMERCIAL

## 2024-03-18 ENCOUNTER — OFFICE VISIT (OUTPATIENT)
Age: 59
End: 2024-03-18
Payer: COMMERCIAL

## 2024-03-18 VITALS — TEMPERATURE: 98 F

## 2024-03-18 DIAGNOSIS — Z87.81 STATUS POST OPEN REDUCTION AND INTERNAL FIXATION (ORIF) OF FRACTURE: Primary | ICD-10-CM

## 2024-03-18 DIAGNOSIS — Z98.890 STATUS POST OPEN REDUCTION AND INTERNAL FIXATION (ORIF) OF FRACTURE: ICD-10-CM

## 2024-03-18 DIAGNOSIS — Z98.890 STATUS POST OPEN REDUCTION AND INTERNAL FIXATION (ORIF) OF FRACTURE: Primary | ICD-10-CM

## 2024-03-18 DIAGNOSIS — Z87.81 STATUS POST OPEN REDUCTION AND INTERNAL FIXATION (ORIF) OF FRACTURE: ICD-10-CM

## 2024-03-18 PROCEDURE — 73110 X-RAY EXAM OF WRIST: CPT

## 2024-03-18 PROCEDURE — 99024 POSTOP FOLLOW-UP VISIT: CPT | Performed by: PLASTIC SURGERY

## 2024-03-18 NOTE — PROGRESS NOTES
[facility]     Follow-up Office Visit       Date: 03/18/2024   Patient Name: Mirlande Wiggins  MRN: [id number]  YOB: 1965    Chief Complaint:   Chief Complaint   Patient presents with    Post-op     1 week status post Left carpal tunnel syndrome - 3/12/25       History of Present Illness:   Mirlande Wiggins is a 58 y.o. female presents for follow-up of left distal radius fracture status post ORIF on 3/6/2024 complicated by postoperative left carpal tunnel syndrome is now status post left carpal tunnel release on 3/12/2024.  Patient reports she has been doing well since surgery.  Reports that she still has some numbness and tingling in her left thumb and index finger however he now feels more with paresthesias and hypersensitivity than roscoe numbness.  Otherwise her pain is well-controlled.  Reports that she still has some left elbow pain but denies loss of range of motion, popping clicking or worsening pain.      Subjective   Review of Systems:   Review of Systems   Constitutional:  Negative for chills, fever, unexpected weight gain and unexpected weight loss.   HENT:  Negative for congestion, postnasal drip and rhinorrhea.    Eyes:  Negative for blurred vision.   Respiratory:  Negative for shortness of breath.    Cardiovascular:  Negative for leg swelling.   Gastrointestinal:  Negative for abdominal pain, nausea and vomiting.   Genitourinary:  Negative for difficulty urinating.   Musculoskeletal:  Positive for arthralgias. Negative for gait problem, joint swelling and myalgias.   Skin:  Negative for skin lesions and wound.   Neurological:  Negative for dizziness, weakness, light-headedness and numbness.   Hematological:  Does not bruise/bleed easily.   Psychiatric/Behavioral:  Negative for depressed mood.    All other systems reviewed and are negative.       Pertinent review of systems per HPI    I reviewed the patient's  chief complaint, history of present illness, review of systems, past medical history, surgical history, family history, social history, medications and allergy list in the EMR on 03/18/2024 and agree with the findings above.    Objective    Vital Signs:   Vitals:    03/18/24 0821   Temp: 98 °F (36.7 °C)     BMI: BMI is within normal parameters. No other follow-up for BMI required.       General Appearance: No acute distress. Alert and oriented.     Chest:  Non-labored breathing on room air      Left upper Extremity:  Left distal radius incision Steri-Strips clean dry intact.  Left carpal tunnel incision healing appropriately.  Clear dry intact.  Fingers warm and well-perfused distally  Sensation intact to light touch in the  radial and ulnar nerve distributions  Sensation diminished to light touch in the thumb.  2 point examination is 1 cm in the thumb.  2-point discrimination is 5 mm in the index finger and 4 mm in the middle finger.  Some weakness with thumb abduction opposition.  Tenderness to palp palpation of the left lateral elbow over the radial head as well as the lateral epicondyle.  Noted no crepitus.  Stable to varus and valgus stress.  Full range of motion.    Imaging/Studies:   Imaging Results (Last 24 Hours)       ** No results found for the last 24 hours. **            X-ray of the left wrist from 3/18/2024 was independently reviewed and interpreted by myself demonstrates left distal radius fracture status post ORIF in stable alignment with intact hardware.    Procedures:  Procedures    Quality Measures:   Quality Measures:   ACP:   ACP discussion was declined by the patient, Patient does not have an advance directive, declines further assistance.    Tobacco:   Mirlande Wiggins  reports that she has never smoked. She has never used smokeless tobacco.    Assessment / Plan    Assessment/Plan:      Diagnosis Plan   1. Status post open reduction and internal fixation (ORIF) of fracture  XR Wrist 3+ View  Left    Ambulatory Referral to Physical Therapy Evaluate and treat, Ortho          Patient presents for follow-up of left distal radius fracture status post ORIF 3/6/2024 followed by left carpal tunnel release on 3/12/2024.  Patient continues to have some neuropraxia of her left median nerve however her 2-point discrimination is improving and she does demonstrate some recovery of the nerve.  I discussed the pathophysiology of nerve compression and nerve neuropraxia with the patient as well as the time course for recovery.  Regarding her distal radius fracture, her fracture remains stable and the hardware is intact.  I will put in a referral for hand therapy to begin gentle range of motion of the left wrist and gradually wean her from her removable brace.  Recommend removable brace of the left wrist in the meantime.  Regarding her left elbow pain, she and her x-rays demonstrate no evidence of bony abnormality and she has no crepitus, clicking or loss of range of motion in her elbow.  Recommend therapy for left elbow range of motion.  She continues to have pain in 4 weeks we will consider repeat x-rays and left elbow MRI.  Recommend follow-up with me in 4 weeks.    Follow Up:   Return in about 4 weeks (around 4/15/2024).        Brent Horan MD  [facility] [place] [facility]

## 2024-03-20 ENCOUNTER — TREATMENT (OUTPATIENT)
Dept: PHYSICAL THERAPY | Facility: CLINIC | Age: 59
End: 2024-03-20
Payer: COMMERCIAL

## 2024-03-20 DIAGNOSIS — M25.532 LEFT WRIST PAIN: ICD-10-CM

## 2024-03-20 DIAGNOSIS — S52.612A CLOSED DISPLACED FRACTURE OF STYLOID PROCESS OF LEFT ULNA, INITIAL ENCOUNTER: ICD-10-CM

## 2024-03-20 DIAGNOSIS — G90.512 COMPLEX REGIONAL PAIN SYNDROME TYPE 1 OF LEFT UPPER EXTREMITY: ICD-10-CM

## 2024-03-20 DIAGNOSIS — Z47.89 ORTHOPEDIC AFTERCARE: ICD-10-CM

## 2024-03-20 DIAGNOSIS — S52.502A CLOSED FRACTURE OF DISTAL END OF LEFT RADIUS, UNSPECIFIED FRACTURE MORPHOLOGY, INITIAL ENCOUNTER: Primary | ICD-10-CM

## 2024-03-20 NOTE — PROGRESS NOTES
Physical Therapy Initial Evaluation and Plan of Care  [facility] [place] [facility] [place]  [address]", [address] [address]        Patient: Mirlande Wiggins   : 1965  Diagnosis/ICD-10 Code:  Closed fracture of distal end of left radius, unspecified fracture morphology, initial encounter [S52.502A]  Referring practitioner: Brent Horan MD  Date of Initial Visit: 3/20/2024  Today's Date: 3/20/2024  Patient seen for 1 sessions         Visit Diagnoses:    ICD-10-CM ICD-9-CM   1. Closed fracture of distal end of left radius, unspecified fracture morphology, initial encounter  S52.502A 813.42   2. Closed displaced fracture of styloid process of left ulna, initial encounter  S52.612A 813.43   3. Left wrist pain  M25.532 719.43   4. Complex regional pain syndrome type 1 of left upper extremity  G90.512 337.21   5. Orthopedic aftercare  Z47.89 V54.9         Subjective Questionnaire: QuickDASH: 90.91%    Subjective Evaluation    History of Present Illness  Date of onset: 3/1/2024  Date of surgery: 3/6/2024 (3/12/2024 CTR)  Mechanism of injury: Walking dog, dog jerked her down, FOOSH onto left hand Resulted in displaced distal radius fracture and ulnar styloid process fracture.  Underwent ORIF, then week later had to under go CTR.      CURRENT COMPLAINT  Constant burning/tingling/numbness iindex/long/thumb. Hypersensitivity of volar thumb and index finger.   Constant Radial side wrist pain more severe than ulnar side wrist pain.      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       Neurological Testing     Sensation     Wrist/Hand   Left   Diminished: light touch, pin prick and sharp/dull discrimination  Hypersensation: light touch     Active Range of Motion   Left Shoulder   Normal active range of motion    Left Elbow   Flexion: WFL  Extension: WFL  Forearm supination: 42 degrees   Forearm pronation: 30 degrees     Left Wrist   Wrist flexion: 15 degrees   Wrist extension: 18 degrees   Radial deviation: 5 degrees   Ulnar deviation: 9 degrees     Left Thumb   Flexion     MP: 70 degrees    DIP: 90 degrees  Extension     MP: 0 degrees    DIP: 45 degrees    Strength/Myotome Testing     Left Wrist/Hand   Wrist extension: 3-  Wrist flexion: 2+  Radial deviation: 2+  Ulnar deviation: 3     (2nd hand position)   Left  strength (2nd hand position) 6 lbs    Thumb Strength  Key/Lateral Pinch     Trial 1: 3 lbs  Palmar/Three-Point Pinch     Trial 1: 3 lbs    Right Wrist/Hand      (2nd hand position)   Right  strength (2nd hand position) 66 lbs    Thumb Strength   Key/Lateral Pinch     Trial 1: 15 lbs  Palmar/Three-Point Pinch     Trial 1: 15 lbs    Tests     Additional Tests Details  Not assessed due to surgical precautions          Assessment & Plan       Assessment  Impairments: abnormal or restricted ROM, activity intolerance, impaired physical strength and pain with function   Functional limitations: carrying objects, lifting, pulling, pushing, uncomfortable because of pain and unable to perform repetitive tasks   Assessment details: Pt. presents with a non-work related injury to the left wrist on 03/1/2024. She experienced a fall on outstretched hands at home that lead to a left distal radius fracture & ulnar styloid process fracture. She had surgery on 3/6/2024 for ORIF of radius, and a CTR on 3/12/2024 after developing severe numbness post injury. She is presenting with  hypersensitivity along median nerve distribution.Problems: decreased ROM, decreased strength, decreased function, and pain . There is moderate swelling at the wrist and hand.    Prognosis: good  Prognosis details:       Goals  Plan Goals: STG to be met in 4 weeks  1. Pt. reports 3/10 pain with wrist movement.   2. Pt. Functional  on the left increase to 15 lbs.   3. Pt. has a >5 lb increase in lateral, and 3 point strength.   4. Pt. has a increase of >20 degrees in wrist flexion and extension.   5. Pt. has a increase of >35 degrees in forearm supination/pronation.   6. Pt has improved function so that Quick Dash score improves to 60%.  LTG to be met in 12 weeks  1. Pt reports 1/10 pain with wrist movement.   2. Pt. is able to  45 lbs without wrist pain.  3. Pt. is able to complete ADLs independently.   4. Pt. has within normal limits in wrist flex/ext, ulnar/radial deviation, supination/pronation.   5. Pt. is independent with HEP.  6. Pt has improved function so that Quick Dash score improves to 30%.    Plan  Therapy options: will be seen for skilled therapy services  Planned modality interventions: fluidotherapy, high voltage pulsed current (pain management) and ultrasound  Planned therapy interventions: compression, fine motor coordination training, functional ROM exercises, home exercise program, joint mobilization, manual therapy, neuromuscular re-education, orthotic fitting/training, soft tissue mobilization, strengthening, stretching and therapeutic activities  Frequency: 2x week  Duration in weeks: 12    Access Code: [email or web address]  URL: [email or web address]  Date: 03/21/2024  Prepared by: Mark Louis    Exercises  - Seated Forearm Pronation and Supination AROM  - 10 x daily - 7 x weekly - 1 sets - 20 reps  - Wrist AROM Flexion Extension  - 6 x daily - 7 x weekly - 1 sets - 20 reps    Timed:         Manual Therapy:    20     mins  46094;     Therapeutic Exercise:    10     mins  06356;      Neuromuscular Noel:        mins  17757;    Therapeutic Activity:     25     mins  48900;     Gait Training:           mins  39651;     Ultrasound:          mins  83738;    Ionto                                   mins   55232  Self Care                            mins   96353  Canalith Repos         mins 97143      Un-Timed:  Electrical Stimulation:         mins  15821 ( );  Dry Needling          mins self-pay  Traction          mins 26402  Low Eval          Mins  29328  Mod Eval     35     Mins  13632  High Eval                            Mins  60908        Timed Treatment:   55   mins   Total Treatment:     90   mins          PT: Mark Louis PT, T     License Number: [id number]  Electronically signed by Mark Louis PT, 03/20/24, 3:46 PM EDT    Initial Certification  Certification Period: 6/18/2024  I certify that the therapy services are furnished while this patient is under my care.  The services outlined above are required by this patient, and will be reviewed every 90 days.     PHYSICIAN: ________________________________________________________  Brent Horan MD        DATE: ____________________________________________________________    Please sign and return via fax to [phone].. Thank you, [facility].

## 2024-03-28 ENCOUNTER — TREATMENT (OUTPATIENT)
Dept: PHYSICAL THERAPY | Facility: CLINIC | Age: 59
End: 2024-03-28
Payer: COMMERCIAL

## 2024-03-28 DIAGNOSIS — M25.532 LEFT WRIST PAIN: ICD-10-CM

## 2024-03-28 DIAGNOSIS — G90.512 COMPLEX REGIONAL PAIN SYNDROME TYPE 1 OF LEFT UPPER EXTREMITY: ICD-10-CM

## 2024-03-28 DIAGNOSIS — Z47.89 ORTHOPEDIC AFTERCARE: ICD-10-CM

## 2024-03-28 DIAGNOSIS — S52.612S CLOSED DISPLACED FRACTURE OF STYLOID PROCESS OF LEFT ULNA, SEQUELA: ICD-10-CM

## 2024-03-28 DIAGNOSIS — S52.502S CLOSED FRACTURE OF DISTAL END OF LEFT RADIUS, UNSPECIFIED FRACTURE MORPHOLOGY, SEQUELA: Primary | ICD-10-CM

## 2024-03-28 NOTE — PROGRESS NOTES
Physical Therapy Daily Treatment Note  [facility] [place] [facility] [place]  [address] [facility], [address] [address]      Patient: Mirlande Wiggins   : 1965  Diagnosis/ICD-10 Code:  Closed fracture of distal end of left radius, unspecified fracture morphology, sequela [S52.502S]  Referring practitioner: No ref. provider found  Date of Initial Visit: Type: THERAPY  Noted: 3/20/2024  Today's Date: 3/28/2024  Patient seen for 2 sessions         Visit Diagnoses:    ICD-10-CM ICD-9-CM   1. Closed fracture of distal end of left radius, unspecified fracture morphology, sequela  S52.502S 905.2   2. Closed displaced fracture of styloid process of left ulna, sequela  S52.612S 905.2   3. Left wrist pain  M25.532 719.43   4. Complex regional pain syndrome type 1 of left upper extremity  G90.512 337.21   5. Orthopedic aftercare  Z47.89 V54.9       Subjective   Mirlande Wiggins reports: Patient is concerned about the amount of edema she still has in her wrist and fingers.  Discomfort with movement of the wrist but not as bad as last week.  Current brace more tolerable than previous one.    Objective   OBSERVATION: Moderate edema left wrist and hand.  Mild edema into the fingers.  PALPATION: Moderate tenderness volar wrist and dorsal carpal areas.  ROM: Left Elbow   Flexion: WFL  Extension: WFL  Forearm supination: 60 degrees   Forearm pronation: 46 degrees     Left Wrist   Wrist flexion: 22 degrees   Wrist extension: 24 degrees   Radial deviation: 5 degrees   Ulnar deviation: 10 degrees   STRENGTH: N/A  OTHER: Sensation still abnormal into the thumb index and long fingers volar aspect left hand.    See Exercise, Manual, and Modality Logs for complete treatment.       Assessment/Plan  Status post distal radius fracture with open reduction internal fixation.  Patient's edema is actually less than expected although is more than she expected.  Range of motion will be slow coming.  Had to switch splints on the patient  because she would not take the other one off due to the discomfort it caused getting it on and off.  Current one is easier to get on and off for but still provides adequate protection to surgery site.  Progress per Plan of Care and Progress strengthening /stabilization /functional activity           Timed:         Manual Therapy:    29     mins  40225;     Therapeutic Exercise:    10     mins  93183;     Neuromuscular Noel:        mins  35647;    Therapeutic Activity:          mins  98497;     Gait Training:           mins  40737;     Ultrasound:          mins  73157;    Ionto                                   mins   74793  Self Care                            mins   08422  Canalith Repos         mins 11186      Un-Timed:  Electrical Stimulation:    20     mins  42151 ( );  Dry Needling          mins self-pay  Traction          mins 18305      Timed Treatment:   39   mins   Total Treatment:     59   mins    Mark Louis PT, CHT  [place] License: [id number]

## 2024-04-02 ENCOUNTER — TREATMENT (OUTPATIENT)
Dept: PHYSICAL THERAPY | Facility: CLINIC | Age: 59
End: 2024-04-02
Payer: COMMERCIAL

## 2024-04-02 DIAGNOSIS — G90.512 COMPLEX REGIONAL PAIN SYNDROME TYPE 1 OF LEFT UPPER EXTREMITY: ICD-10-CM

## 2024-04-02 DIAGNOSIS — S52.502S CLOSED FRACTURE OF DISTAL END OF LEFT RADIUS, UNSPECIFIED FRACTURE MORPHOLOGY, SEQUELA: Primary | ICD-10-CM

## 2024-04-02 DIAGNOSIS — Z47.89 ORTHOPEDIC AFTERCARE: ICD-10-CM

## 2024-04-02 DIAGNOSIS — M25.532 LEFT WRIST PAIN: ICD-10-CM

## 2024-04-02 NOTE — PROGRESS NOTES
Physical Therapy Daily Treatment Note  [facility] [place] [facility] [place]  [address]  [place] [address]       Patient: Mirlande Wiggins   : 1965  Diagnosis/ICD-10 Code:  Closed fracture of distal end of left radius, unspecified fracture morphology, sequela [S52.502S]  Referring practitioner: Brent Horan MD  Date of Initial Visit: Type: THERAPY  Noted: 3/20/2024  Today's Date: 2024  Patient seen for 3 sessions         Visit Diagnoses:    ICD-10-CM ICD-9-CM   1. Closed fracture of distal end of left radius, unspecified fracture morphology, sequela  S52.502S 905.2   2. Left wrist pain  M25.532 719.43   3. Complex regional pain syndrome type 1 of left upper extremity  G90.512 337.21   4. Orthopedic aftercare  Z47.89 V54.9       Subjective   Mirlande Wiggins reports: her pain has been the same and there hasn't been much change since her last visit.    Objective   Left pronation limited compared to right  Scar is hypomobile    See Exercise, Manual, and Modality Logs for complete treatment.       Assessment/Plan  Patient's range of motion is coming along slowly. She continues to demonstrate some level of fear avoidance with actively moving the arm which she was educated on that she needs to continue to work on this outside of therapy sessions. It has been improving though will likely need to be reinforced at future sessions. Her splint was adjusted to relieve a pressure spot that was beginning to occur over her radial styloid.  Progress per Plan of Care and Progress strengthening /stabilization /functional activity           Timed:         Manual Therapy:    45     mins  56328;     Therapeutic Exercise:         mins  54511;     Neuromuscular Noel:        mins  78954;    Therapeutic Activity:     15     mins  43186;     Gait Training:           mins  70054;     Ultrasound:          mins  78262;    Ionto                                   mins   83460  Self Care                            mins    34724  [facility]         mins 30315      Un-Timed:  Electrical Stimulation:         mins  54061 ( );  Dry Needling          mins self-pay  Traction          mins 23424      Timed Treatment:   60   mins   Total Treatment:     60   mins    Nadira Wang, Student Physical Therapist     I was present in the PT Department guiding the student by approving, concurring, and confirming the skilled judgement for all services rendered.     Mark Louis, PT  Physical Therapist

## 2024-04-04 ENCOUNTER — TREATMENT (OUTPATIENT)
Dept: PHYSICAL THERAPY | Facility: CLINIC | Age: 59
End: 2024-04-04
Payer: COMMERCIAL

## 2024-04-04 DIAGNOSIS — S52.502S CLOSED FRACTURE OF DISTAL END OF LEFT RADIUS, UNSPECIFIED FRACTURE MORPHOLOGY, SEQUELA: Primary | ICD-10-CM

## 2024-04-04 DIAGNOSIS — M25.532 LEFT WRIST PAIN: ICD-10-CM

## 2024-04-04 DIAGNOSIS — G90.512 COMPLEX REGIONAL PAIN SYNDROME TYPE 1 OF LEFT UPPER EXTREMITY: ICD-10-CM

## 2024-04-04 DIAGNOSIS — Z47.89 ORTHOPEDIC AFTERCARE: ICD-10-CM

## 2024-04-04 NOTE — PROGRESS NOTES
Physical Therapy Daily Treatment Note  [facility] [place]  [address] [facility], [facility] [address]  [facility] [address]      Patient: Mirlande Wiggins   : 1965  Diagnosis/ICD-10 Code:  Closed fracture of distal end of left radius, unspecified fracture morphology, sequela [S52.502S]  Referring practitioner: Brent Horan MD  Date of Initial Visit: Type: THERAPY  Noted: 3/20/2024  Today's Date: 2024  Patient seen for 4 sessions         Visit Diagnoses:    ICD-10-CM ICD-9-CM   1. Closed fracture of distal end of left radius, unspecified fracture morphology, sequela  S52.502S 905.2   2. Left wrist pain  M25.532 719.43   3. Complex regional pain syndrome type 1 of left upper extremity  G90.512 337.21   4. Orthopedic aftercare  Z47.89 V54.9       Subjective   Mirlande Wiggins reports: CTR scar is very sensitive. Hurt will being worked on last time, but overall not as bad.  Numbness in the thumb and index fingers are about the same.      Objective   OBSERVATION: Patient wearing splint.  Mild edema posterior wrist and dorsal hand.  Surgical scars closed healing well.  PALPATION: Mildly hypersensitive over carpal tunnel release scar.  Does not appear to be sensitive over the forearm scar.  Tenderness noted throughout the carpal area dorsally greater than volarly.  ROM: Left wrist: Flexion 23 degrees, extension 30 degrees, supination 69 degrees, pronation 55 degrees.  STRENGTH:  still very weak cannot make a full composite fist limited by pain avoidance as well.      See Exercise, Manual, and Modality Logs for complete treatment.     Access Code: [email or web address]  URL: [email or web address]  Date: 2024  Prepared by: Mark Louis    Exercises  - Putty Squeezes  - 3 x daily - 7 x weekly - 3 sets - 10 reps  - Finger Pinch and Pull with Putty  - 3 x daily - 7 x weekly - 3 sets - 10 reps    Assessment/Plan  Patient's  is weak which is to be expected that home exercise program should help with  this some.  She is showing slow improvements in range of motion specially with forearm supination pronation and wrist extension.  Wrist flexion still significantly limited.  Finger range of motion is functional but not full composite fist.  Edema is less.  Hypersensitivity of the scar area is less severe as well.  Progress per Plan of Care and Progress strengthening /stabilization /functional activity           Timed:         Manual Therapy:    31     mins  28206;     Therapeutic Exercise:    32     mins  33189;     Neuromuscular Noel:       mins  30787;    Therapeutic Activity:          mins  26343;     Gait Training:           mins  83810;     Ultrasound:          mins  70946;    Ionto                                   mins   41164  Self Care                            mins   86356  Canalith Repos         mins 64204      Un-Timed:  Electrical Stimulation:         mins  32375 ( );  Dry Needling          mins self-pay  Traction          mins 63218      Timed Treatment:   63   mins   Total Treatment:     63   mins    Mark Louis, PT  [place] License: [id number]

## 2024-04-10 ENCOUNTER — TREATMENT (OUTPATIENT)
Dept: PHYSICAL THERAPY | Facility: CLINIC | Age: 59
End: 2024-04-10
Payer: COMMERCIAL

## 2024-04-10 DIAGNOSIS — G90.512 COMPLEX REGIONAL PAIN SYNDROME TYPE 1 OF LEFT UPPER EXTREMITY: ICD-10-CM

## 2024-04-10 DIAGNOSIS — S52.502S CLOSED FRACTURE OF DISTAL END OF LEFT RADIUS, UNSPECIFIED FRACTURE MORPHOLOGY, SEQUELA: Primary | ICD-10-CM

## 2024-04-10 DIAGNOSIS — M25.532 LEFT WRIST PAIN: ICD-10-CM

## 2024-04-10 DIAGNOSIS — Z47.89 ORTHOPEDIC AFTERCARE: ICD-10-CM

## 2024-04-10 NOTE — PROGRESS NOTES
Physical Therapy Daily Treatment Note  [facility] [place] [facility]ERAP[facility] [place]  [address] [facility], [address] [address]      Patient: Mirlande Wiggins   : 1965  Diagnosis/ICD-10 Code:  Closed fracture of distal end of left radius, unspecified fracture morphology, sequela [S52.502S]  Referring practitioner: Brent Horan MD  Date of Initial Visit: Type: THERAPY  Noted: 3/20/2024  Today's Date: 4/10/2024  Patient seen for 5 sessions         Visit Diagnoses:    ICD-10-CM ICD-9-CM   1. Closed fracture of distal end of left radius, unspecified fracture morphology, sequela  S52.502S 905.2   2. Complex regional pain syndrome type 1 of left upper extremity  G90.512 337.21   3. Left wrist pain  M25.532 719.43   4. Orthopedic aftercare  Z47.89 V54.9       Subjective   Mirlande Wiggins reports: Becoming less painful to move the wrist and forearm.  Carpal tunnel scar is  and very sensitive.  At times the radial side of the wrist is hypersensitive.    Objective   OBSERVATION: Patient wearing splint.  Mild edema posterior wrist and dorsal hand.  Surgical scars closed healing well.  PALPATION: Mildly hypersensitive over carpal tunnel release scar, less severe than last week.    ROM: Left wrist: Flexion 24 degrees, extension 33 degrees, supination 65 degrees, pronation 60 degrees.  Full composite fist with the fingers.  STRENGTH: Wrist extension 3+/5 wrist flexion 3+/5  See Exercise, Manual, and Modality Logs for complete treatment.       Assessment/Plan  Status post distal radius and ulnar styloid process fractures.  Open reduction internal fixation of the distal radius.  Also status post carpal tunnel release.  Hypersensitivity still present around the carpal tunnel but not as severe.  Range of motion of the wrist is slowly improving.  Patient is okay to not wear splint at night.  This may help with some of the wrist stiffness.  Progress per Plan of Care and Progress strengthening /stabilization  /functional activity           Timed:         Manual Therapy:    28     mins  90078;     Therapeutic Exercise:    20     mins  26175;     Neuromuscular Noel:        mins  12135;    Therapeutic Activity:     15     mins  52120;     Gait Training:           mins  78832;     Ultrasound:          mins  07541;    Ionto                                   mins   71111  Self Care                            mins   10976  Canalith Repos         mins 24698      Un-Timed:  Electrical Stimulation:         mins  75415 ( );  Dry Needling          mins self-pay  Traction          mins 59503      Timed Treatment:   63   mins   Total Treatment:     63   mins    Mark Louis PT, CHT  [place] License: [id number]

## 2024-04-12 ENCOUNTER — TREATMENT (OUTPATIENT)
Dept: PHYSICAL THERAPY | Facility: CLINIC | Age: 59
End: 2024-04-12
Payer: COMMERCIAL

## 2024-04-12 DIAGNOSIS — Z47.89 ORTHOPEDIC AFTERCARE: ICD-10-CM

## 2024-04-12 DIAGNOSIS — M25.532 LEFT WRIST PAIN: ICD-10-CM

## 2024-04-12 DIAGNOSIS — G90.512 COMPLEX REGIONAL PAIN SYNDROME TYPE 1 OF LEFT UPPER EXTREMITY: ICD-10-CM

## 2024-04-12 DIAGNOSIS — S52.502S CLOSED FRACTURE OF DISTAL END OF LEFT RADIUS, UNSPECIFIED FRACTURE MORPHOLOGY, SEQUELA: Primary | ICD-10-CM

## 2024-04-12 NOTE — LETTER
Progress Note  4/15/2024  [facility], Brent Jade MD    Re: Mirlande Wiggins  ________________________________________________________________    Mirlande Wiggins, has attended 6/6 PT sessions.  Treatment has consisted of: scar mobilization, jt mob, AROM/AAROM/PROM, mild strengthening.      S: Mirlande Wiggins states: Left thumb and index finger still totally numb, long finger not as bad.  Scar sensitive but less severe.  Wrist starting to move some better.  O:   Tenderness   Volar wrist/Carpal Tunnel scar hyper-sensitive but less severe.      Active Range of Motion    Left Elbow   Forearm supination: 70 degrees   Forearm pronation: 56 degrees    Left Wrist   Wrist flexion: 32 degrees   Wrist extension: 34 degrees   Radial deviation: 20 degrees   Ulnar deviation: 18 degrees      Full composite fist  Strength/Myotome Testing    (2nd hand position)   Left  strength (2nd hand position) 13 lbs  Right  strength (2nd hand position) 65 lbs     A: Wrist ROM slowly improving.  strength has improved was 8 lbs. Scar and palm are less sensitive. Suggest we continue PT.     P: Please advise after your exam.    Thank you for this referral.    Mark Louis, PT, CHT

## 2024-04-12 NOTE — PROGRESS NOTES
Physical Therapy Daily Treatment Note  [facility] [place]  [address] [facility] [facility] [address]  [facility] [address]      Patient: Mirlande Wiggins   : 1965  Diagnosis/ICD-10 Code:  Closed fracture of distal end of left radius, unspecified fracture morphology, sequela [S52.502S]  Referring practitioner: Brent Horan MD  Date of Initial Visit: Type: THERAPY  Noted: 3/20/2024  Today's Date: 2024  Patient seen for 6 sessions         Visit Diagnoses:    ICD-10-CM ICD-9-CM   1. Closed fracture of distal end of left radius, unspecified fracture morphology, sequela  S52.502S 905.2   2. Complex regional pain syndrome type 1 of left upper extremity  G90.512 337.21   3. Left wrist pain  M25.532 719.43   4. Orthopedic aftercare  Z47.89 V54.9       Subjective   Mirlande Wiggins reports: Left thumb and index finger still totally numb, long finger not as bad.  Scar sensitive but less severe.  Wrist starting to move some better.    Objective          Tenderness     Additional Tenderness Details  Volar wrist/Carpal Tunnel scar hyper-sensitive but less severe.     Active Range of Motion     Left Elbow   Forearm supination: 70 degrees   Forearm pronation: 56 degrees     Left Wrist   Wrist flexion: 32 degrees   Wrist extension: 34 degrees   Radial deviation: 20 degrees   Ulnar deviation: 18 degrees     Additional Active Range of Motion Details  Full composite fist    Strength/Myotome Testing     Left Wrist/Hand      (2nd hand position)   Left  strength (2nd hand position) 13 lbs    Right Wrist/Hand      (2nd hand position)   Right  strength (2nd hand position) 65 lbs        See Exercise, Manual, and Modality Logs for complete treatment.       Assessment/Plan  Wrist ROM slowly improving.  strength has improved was 8 lbs.  Scar and palm are less sensitive.  Suggest we continue PT.  Progress per Plan of Care and Progress strengthening /stabilization /functional activity           Timed:          Manual Therapy:    30     mins  73819;     Therapeutic Exercise:    20     mins  44507;     Neuromuscular Noel:        mins  76424;    Therapeutic Activity:     15     mins  52713;     Gait Training:           mins  41599;     Ultrasound:          mins  11561;    Ionto                                   mins   19535  Self Care                            mins   68579  Canalith Repos         mins 92792      Un-Timed:  Electrical Stimulation:         mins  46024 ( );  Dry Needling          mins self-pay  Traction          mins 50051      Timed Treatment:   65   mins   Total Treatment:     65   mins    Mark Louis PT, T  [place] License: [id number]

## 2024-04-15 ENCOUNTER — HOSPITAL ENCOUNTER (OUTPATIENT)
Dept: GENERAL RADIOLOGY | Facility: HOSPITAL | Age: 59
Discharge: HOME | End: 2024-04-15
Admitting: PLASTIC SURGERY
Payer: COMMERCIAL

## 2024-04-15 ENCOUNTER — OFFICE VISIT (OUTPATIENT)
Age: 59
End: 2024-04-15
Payer: COMMERCIAL

## 2024-04-15 DIAGNOSIS — Z98.890 STATUS POST OPEN REDUCTION AND INTERNAL FIXATION (ORIF) OF FRACTURE: Primary | ICD-10-CM

## 2024-04-15 DIAGNOSIS — Z98.890 STATUS POST OPEN REDUCTION AND INTERNAL FIXATION (ORIF) OF FRACTURE: ICD-10-CM

## 2024-04-15 DIAGNOSIS — Z87.81 STATUS POST OPEN REDUCTION AND INTERNAL FIXATION (ORIF) OF FRACTURE: ICD-10-CM

## 2024-04-15 DIAGNOSIS — Z87.81 STATUS POST OPEN REDUCTION AND INTERNAL FIXATION (ORIF) OF FRACTURE: Primary | ICD-10-CM

## 2024-04-15 DIAGNOSIS — M25.552 LEFT HIP PAIN: Primary | ICD-10-CM

## 2024-04-15 PROCEDURE — 73110 X-RAY EXAM OF WRIST: CPT

## 2024-04-15 PROCEDURE — 99024 POSTOP FOLLOW-UP VISIT: CPT | Performed by: PLASTIC SURGERY

## 2024-04-15 NOTE — PROGRESS NOTES
[facility] Orthopedic     Follow-up Office Visit       Date: 04/15/2024   Patient Name: Mirlande Wiggins  MRN: [id number]  YOB: 1965    Chief Complaint:   Chief Complaint   Patient presents with    Post-op     4 week recheck - status post Left carpal tunnel syndrome - 3/12/25  &  Left Open reduction internal fixation of intra-articular distal radius fracture > 3 pieces - 3/6/24       History of Present Illness:   Mirlande Wiggins is a 58 y.o. female presents for 5-week follow-up status post left distal radius fracture ORIF and left carpal tunnel release.  She reports that her pain is slowly improving since her last visit.  She has been working with hand therapy and has some improved range of motion of her left wrist and hand.  She reports that she is no longer having numbness and tingling in her left middle finger however she does have some persistent paresthesias in her index and thumb with hypersensitivity.  She does have some persistent ulnar wrist pain as well as difficulty with pronation and supination however this is improved with therapy.      Subjective   Review of Systems:   Review of Systems   Constitutional:  Negative for chills, fever, unexpected weight gain and unexpected weight loss.   HENT:  Negative for congestion, postnasal drip and rhinorrhea.    Eyes:  Negative for blurred vision.   Respiratory:  Negative for shortness of breath.    Cardiovascular:  Negative for leg swelling.   Gastrointestinal:  Negative for abdominal pain, nausea and vomiting.   Genitourinary:  Negative for difficulty urinating.   Musculoskeletal:  Positive for arthralgias. Negative for gait problem, joint swelling and myalgias.   Skin:  Negative for skin lesions and wound.   Neurological:  Negative for dizziness, weakness, light-headedness and numbness.   Hematological:  Does not bruise/bleed easily.   Psychiatric/Behavioral:  Negative for  depressed mood.         Pertinent review of systems per HPI    I reviewed the patient's chief complaint, history of present illness, review of systems, past medical history, surgical history, family history, social history, medications and allergy list in the EMR on 04/15/2024 and agree with the findings above.    Objective    Vital Signs: There were no vitals filed for this visit.  BMI: There is no height or weight on file to calculate BMI.     General Appearance: No acute distress. Alert and oriented.     Chest:  Non-labored breathing on room air      Ortho Exam:  Left carpal tunnel and distal radius incision clean dry intact and healing appropriately.  Patient has intact 2 point sensation along the middle finger.  Patient does have intact sensation on the index and thumb however there is hyper sensitivity.  Patient flexion and extension at the wrist remain limited.  She has full pronation.  Limited supination.  Fingers warm and well-perfused distally  Sensation intact to light touch in the median, radial and ulnar nerve distributions    Imaging/Studies:   Imaging Results (Last 24 Hours)       ** No results found for the last 24 hours. **            X-ray of the left wrist from 4/15/2024 independently reviewed and interpreted by myself and demonstrates stable alignment of left distal radius fracture and intact hardware.    Procedures:  Procedures    Quality Measures:   ACP:   ACP discussion was deferred.    Tobacco:   Mirlande Wiggins  reports that she has never smoked. She has never used smokeless tobacco.    Assessment / Plan    Assessment/Plan:      Diagnosis Plan   1. Left hip pain  Ambulatory Referral to Physical Therapy Evaluate and treat, Ortho      2. Status post open reduction and internal fixation (ORIF) of fracture            Patient presents for 5-week follow-up status post left distal radius ORIF as well as carpal tunnel release for postoperative acute carpal tunnel syndrome.  She has been working with  hand therapy and her median nerve neuropraxia is slowly improved since her last visit.  Her fracture is in stable alignment and she has no evidence of hardware complication on x-ray.  Recommend continued progression of hand therapy protocol.  Recommend patient follow-up with me in 6 weeks for repeat x-rays and exam.    Follow Up:   Return in about 6 weeks (around 5/27/2024).        Brent Horan MD  [facility]

## 2024-04-16 ENCOUNTER — TREATMENT (OUTPATIENT)
Dept: PHYSICAL THERAPY | Facility: CLINIC | Age: 59
End: 2024-04-16
Payer: COMMERCIAL

## 2024-04-16 DIAGNOSIS — S52.612S CLOSED DISPLACED FRACTURE OF STYLOID PROCESS OF LEFT ULNA, SEQUELA: ICD-10-CM

## 2024-04-16 DIAGNOSIS — Z47.89 ORTHOPEDIC AFTERCARE: ICD-10-CM

## 2024-04-16 DIAGNOSIS — M25.532 LEFT WRIST PAIN: ICD-10-CM

## 2024-04-16 DIAGNOSIS — S52.502S CLOSED FRACTURE OF DISTAL END OF LEFT RADIUS, UNSPECIFIED FRACTURE MORPHOLOGY, SEQUELA: Primary | ICD-10-CM

## 2024-04-16 NOTE — PROGRESS NOTES
Physical Therapy Daily Treatment Note  [facility] [place]  [address]  [facility] [address]      Patient: Mirlande Wiggins   : 1965  Diagnosis/ICD-10 Code:  Closed fracture of distal end of left radius, unspecified fracture morphology, sequela [S52.502S]  Referring practitioner: Brent Horan MD  Date of Initial Visit: Type: THERAPY  Noted: 3/20/2024  Today's Date: 2024  Patient seen for 7 sessions         Visit Diagnoses:    ICD-10-CM ICD-9-CM   1. Closed fracture of distal end of left radius, unspecified fracture morphology, sequela  S52.502S 905.2   2. Closed displaced fracture of styloid process of left ulna, sequela  S52.612S 905.2   3. Left wrist pain  M25.532 719.43   4. Orthopedic aftercare  Z47.89 V54.9       Subjective   Mirlande Wiggins reports: saw MD, said x-ray shows healing.  Wrist is stiff, CTR scar is sensitive but not as bad. Can now feel index finger volar P1. P2/P3 numb.    Objective   OBSERVATION: Still wearing volar wrist splint  PALPATION: Tender at carpal tunnel release scar but significantly less severe.  Seems to be more tender along the volar and dorsal carpal bones themself.  ROM: Left Elbow   Forearm supination: 70 degrees   Forearm pronation: 58 degrees     Left Wrist   Wrist flexion: 35 degrees   Wrist extension: 40 degrees   Radial deviation: 20 degrees   Ulnar deviation: 18 degrees   STRENGTH:  not assessed.  Wrist extension/wrist flexion functionally 3/5    See Exercise, Manual, and Modality Logs for complete treatment.       Assessment/Plan  Status post left distal radius fracture with open reduction internal fixation.  Range of motion slowly improving.  Extension range of motion shown the best improvement today.  The complaints of scar hyper-sensitivity is significantly improved.  She seems to be more willing to move her hand and wrist now.  Progress per Plan of Care and Progress strengthening /stabilization /functional  activity           Timed:         Manual Therapy:    24     mins  19827;     Therapeutic Exercise:    20     mins  12614;     Neuromuscular Noel:        mins  84374;    Therapeutic Activity:     17     mins  13726;     Gait Training:           mins  31979;     Ultrasound:          mins  92663;    Ionto                                   mins   71738  Self Care                            mins   55232  Canalith Repos         mins 89696      Un-Timed:  Electrical Stimulation:         mins  19346 ( );  Dry Needling          mins self-pay  Traction          mins 64407      Timed Treatment:   61   mins   Total Treatment:     61   mins    Mark Louis PT, CHT  [place] License: [id number]

## 2024-04-18 ENCOUNTER — TREATMENT (OUTPATIENT)
Dept: PHYSICAL THERAPY | Facility: CLINIC | Age: 59
End: 2024-04-18
Payer: COMMERCIAL

## 2024-04-18 DIAGNOSIS — S52.612S CLOSED DISPLACED FRACTURE OF STYLOID PROCESS OF LEFT ULNA, SEQUELA: ICD-10-CM

## 2024-04-18 DIAGNOSIS — M25.532 LEFT WRIST PAIN: ICD-10-CM

## 2024-04-18 DIAGNOSIS — Z47.89 ORTHOPEDIC AFTERCARE: ICD-10-CM

## 2024-04-18 DIAGNOSIS — S52.502S CLOSED FRACTURE OF DISTAL END OF LEFT RADIUS, UNSPECIFIED FRACTURE MORPHOLOGY, SEQUELA: Primary | ICD-10-CM

## 2024-04-18 NOTE — PROGRESS NOTES
Physical Therapy Daily Treatment Note  [facility] [place]  [address], [address] [facility] [address]  [place] [address]       Patient: Mirlande Wiggins   : 1965  Diagnosis/ICD-10 Code:  Closed fracture of distal end of left radius, unspecified fracture morphology, sequela [S52.502S]  Referring practitioner: Brent Horan MD  Date of Initial Visit: Type: THERAPY  Noted: 3/20/2024  Today's Date: 2024  Patient seen for 8 sessions         Visit Diagnoses:    ICD-10-CM ICD-9-CM   1. Closed fracture of distal end of left radius, unspecified fracture morphology, sequela  S52.502S 905.2   2. Closed displaced fracture of styloid process of left ulna, sequela  S52.612S 905.2   3. Left wrist pain  M25.532 719.43   4. Orthopedic aftercare  Z47.89 V54.9       Subjective   Mirlande Wiggins reports: it has been feeling better and she has been able to play the piano some but still has a hard time placing her hand flat on the keys.    Objective   OBSERVATION: wearing volar wrist splint  PALPATION: tender over proximal palm where carpal tunnel release scar is though significantly less than it has been in previous sessions    See Exercise, Manual, and Modality Logs for complete treatment.       Assessment/Plan  Range of motion continues to improve though left wrist flexion and extension is still very difficult. Hypersensitivity over scar have improved and is more willing to move her wrist and hand. Various gripping activities are tolerated with minimal reports of discomfort.  Progress per Plan of Care, Progress strengthening /stabilization /functional activity, and Other  Assess  strength at next session         Timed:         Manual Therapy:    30     mins  88848;     Therapeutic Exercise:    25     mins  62428;     Neuromuscular Noel:        mins  09263;    Therapeutic Activity:          mins  15460;     Gait Training:           mins  27583;     Ultrasound:          mins  53888;    Ionto                                    mins   14886  Self Care                            mins   52180  Canalith Repos         mins 49667      Un-Timed:  Electrical Stimulation:         mins  77225 ( );  Dry Needling          mins self-pay  Traction          mins 72564      Timed Treatment:   55   mins   Total Treatment:     55   mins    Nadira Wang, Student Physical Therapist     I was present in the PT Department guiding the student by approving, concurring, and confirming the skilled judgement for all services rendered.     Mark Louis, PT  Physical Therapist

## 2024-04-23 ENCOUNTER — TREATMENT (OUTPATIENT)
Dept: PHYSICAL THERAPY | Facility: CLINIC | Age: 59
End: 2024-04-23
Payer: COMMERCIAL

## 2024-04-23 DIAGNOSIS — M70.62 GREATER TROCHANTERIC BURSITIS OF LEFT HIP: ICD-10-CM

## 2024-04-23 DIAGNOSIS — M25.552 PAIN OF LEFT HIP: Primary | ICD-10-CM

## 2024-04-23 PROCEDURE — 97162 PT EVAL MOD COMPLEX 30 MIN: CPT | Performed by: PHYSICAL THERAPIST

## 2024-04-23 PROCEDURE — 97035 APP MDLTY 1+ULTRASOUND EA 15: CPT | Performed by: PHYSICAL THERAPIST

## 2024-04-23 NOTE — PROGRESS NOTES
Physical Therapy Initial Evaluation and Plan of Care  [facility] [place]  [address], [address] [address]        Patient: Mirlande Wiggins   : 1965  Diagnosis/ICD-10 Code:  Pain of left hip [M25.552]  Referring practitioner: Brent Horan MD  Date of Initial Visit: 2024  Today's Date: 2024  Patient seen for 1 sessions         Visit Diagnoses:    ICD-10-CM ICD-9-CM   1. Pain of left hip  M25.552 719.45   2. Greater trochanteric bursitis of left hip  M70.62 726.5         Subjective Questionnaire: LEFS: 38/80    Subjective Evaluation    History of Present Illness  Date of onset: 3/1/2024  Mechanism of injury: Walking dog, dog jerked her down, FOOSH onto left hand Resulted in displaced distal radius fracture and ulnar styloid process fracture.  Underwent ORIF, then week later had to under go CTR.  She also landed on the left, hitting the curb.  Has been experiencing pain in left hip since.        Patient Occupation: N/A Pain  Current pain ratin  At best pain ratin  At worst pain ratin  Location: Constant left hip posterior/lateral greater trochantric process area.  Pain is varying in intensity.  No parathesia or anaesthesia.  Quality: dull ache, tight and pulling  Relieving factors: change in position (Walking)  Exacerbated by: Sitting, left side lying, sit to stand, palpating the area of pain.  Progression: no change    Hand dominance: right    Diagnostic Tests  X-ray: normal             Objective          Static Posture   General Observations  Symmetrical weight bearing.     Observations   Left Hip  Positive for edema (GT bursa).       Tenderness     Left Hip   Tenderness in the greater trochanter (Bursa).     Neurological Testing     Reflexes   Left   Patellar (L4): normal (2+)  Achilles (S1): normal (2+)    Right   Patellar (L4): normal (2+)  Achilles (S1): normal (2+)    Active Range of Motion     Lumbar   Normal active range of motion  Left Hip    Normal active range of motion    Strength/Myotome Testing     Left Hip   Normal muscle strength    Tests     Left Hip   Positive Sebastien.   Negative OWEN, FADIR, piriformis, scour and SI compression.     Ambulation   Weight-Bearing Status   Weight-Bearing Status (Left): full weight bearing   Weight-Bearing Status (Right): full weight-bearing    Assistive device used: none    Observational Gait   Gait: within functional limits           Assessment & Plan       Assessment  Impairments: activity intolerance and pain with function   Functional limitations: sleeping and uncomfortable because of pain   Assessment details: 58 year old female presenting with left lateral hip pain after a fall onto the left side of her body on 3/1/2024.  She presents with signs and symptoms of Greater Trochanteric bursitis. Lumbar exam is normal today. Neurological exam is normal today.  Prognosis: good    Goals  Plan Goals: STG to be met in 4 weeks  1.  Pt has 50% decrease in left lateral hip pain.  2.  Pt is able sleep with less frequent episodes of being awakened by hip pain.  3.  Normalize IT band length.  LTG to be met in 8 weeks  1.  Pt is independent with each phase of HEP.  2.  Pt can lay on left side of body through night without being awaken   3.  Pt has improved LE function so that LEFS score improves to 60/80.  4.  Resolve tenderness of left lateral hip area.    Plan  Therapy options: will be seen for skilled therapy services  Planned modality interventions: dry needling, cryotherapy, high voltage pulsed current (spasm management), high voltage pulsed current (pain management), ultrasound and iontophoresis  Planned therapy interventions: balance/weight-bearing training, body mechanics training, flexibility, functional ROM exercises, joint mobilization, therapeutic activities, stretching, strengthening and spinal/joint mobilization      Timed:         Manual Therapy:         mins  15046;     Therapeutic Exercise:         mins   67969;     Neuromuscular Noel:        mins  45877;    Therapeutic Activity:          mins  60225;     Gait Training:           mins  40086;     Ultrasound:     15     mins  37369;    Ionto                                   mins   15016  Self Care                            mins   70023  Canalith Repos         mins 56374      Un-Timed:  Electrical Stimulation:         mins  32470 ( );  Dry Needling          mins self-pay  Traction          mins 64530  Low Eval          Mins  78241  Mod Eval     25     Mins  00575  High Eval                            Mins  41637        Timed Treatment:   15   mins   Total Treatment:     40   mins          PT: Mark Louis PT     License Number: [id number]  Electronically signed by Mark Louis PT, 04/23/24, 11:17 AM EDT    Initial Certification  Certification Period: 7/22/2024  I certify that the therapy services are furnished while this patient is under my care.  The services outlined above are required by this patient, and will be reviewed every 90 days.     PHYSICIAN: ________________________________________________________  Brent Horan MD        DATE: ____________________________________________________________    Please sign and return via fax to [phone].. Thank you, [facility].

## 2024-04-25 ENCOUNTER — TREATMENT (OUTPATIENT)
Dept: PHYSICAL THERAPY | Facility: CLINIC | Age: 59
End: 2024-04-25
Payer: COMMERCIAL

## 2024-04-25 DIAGNOSIS — M25.532 LEFT WRIST PAIN: ICD-10-CM

## 2024-04-25 DIAGNOSIS — M25.552 PAIN OF LEFT HIP: Primary | ICD-10-CM

## 2024-04-25 DIAGNOSIS — S52.502S CLOSED FRACTURE OF DISTAL END OF LEFT RADIUS, UNSPECIFIED FRACTURE MORPHOLOGY, SEQUELA: ICD-10-CM

## 2024-04-25 DIAGNOSIS — S52.612S CLOSED DISPLACED FRACTURE OF STYLOID PROCESS OF LEFT ULNA, SEQUELA: ICD-10-CM

## 2024-04-25 DIAGNOSIS — M70.62 GREATER TROCHANTERIC BURSITIS OF LEFT HIP: ICD-10-CM

## 2024-04-25 NOTE — PROGRESS NOTES
Physical Therapy Daily Treatment Note  [facility] [place]  [address], [address] [address]      Patient: Mirlande Wiggins   : 1965  Diagnosis/ICD-10 Code:  Pain of left hip [M25.552]  Referring practitioner: Brent Horan MD  Date of Initial Visit: Type: THERAPY  Noted: 3/20/2024    Type: THERAPY  Noted: 2024  Today's Date: 2024  Patient seen for 9 sessions         Visit Diagnoses:    ICD-10-CM ICD-9-CM   1. Pain of left hip  M25.552 719.45   2. Greater trochanteric bursitis of left hip  M70.62 726.5   3. Closed fracture of distal end of left radius, unspecified fracture morphology, sequela  S52.502S 905.2   4. Closed displaced fracture of styloid process of left ulna, sequela  S52.612S 905.2   5. Left wrist pain  M25.532 719.43       Subjective   Mirlande Wiggins reports: Left hip feels about the same.  Wrist is very stiff.  Did play the piano for about 3 hours yesterday.  Would like to be able to flex the wrist some better.    Objective   OBSERVATION: Gait appears to be normal.  Left dorsal hand has mild edema.  PALPATION: Left lateral hip and greater trochanter bursa is tender.  Dorsal carpal area left wrist tender.  ROM: Left hip within normal limits.  Left wrist flexion 20 degrees, extension 47 degrees.  STRENGTH:  strength left 25 pounds, right 65 pounds.  OTHER: Less hypersensitivity over surgical scars of the hand.    See Exercise, Manual, and Modality Logs for complete treatment.       Assessment/Plan  Left wrist extension range of motion best to date.  Still significant deficit in  on the left compared to right, left  is not functional yet, but has improved significantly.  Signs and symptoms of traumatic left hip greater trochanter bursitis persist.  Encouraging that the patient can play PNO for extended period of time again.  Progress per Plan of Care and Progress strengthening /stabilization /functional activity           Timed:          Manual Therapy:    27     mins  95119;     Therapeutic Exercise:    24     mins  19780;     Neuromuscular Noel:        mins  95153;    Therapeutic Activity:          mins  06944;     Gait Training:           mins  19628;     Ultrasound:     15     mins  97586;    Ionto                                   mins   11156  Self Care                            mins   92374  Canalith Repos         mins 84969      Un-Timed:  Electrical Stimulation:    20     mins  08120 ( );  Dry Needling          mins self-pay  Traction          mins 63122      Timed Treatment:   66   mins   Total Treatment:     86   mins    Mark Louis PT  [place] License: [id number]

## 2024-04-29 ENCOUNTER — TREATMENT (OUTPATIENT)
Dept: PHYSICAL THERAPY | Facility: CLINIC | Age: 59
End: 2024-04-29
Payer: COMMERCIAL

## 2024-04-29 DIAGNOSIS — M25.552 PAIN OF LEFT HIP: Primary | ICD-10-CM

## 2024-04-29 DIAGNOSIS — M70.62 GREATER TROCHANTERIC BURSITIS OF LEFT HIP: ICD-10-CM

## 2024-04-29 DIAGNOSIS — M25.532 LEFT WRIST PAIN: ICD-10-CM

## 2024-04-29 DIAGNOSIS — S52.502S CLOSED FRACTURE OF DISTAL END OF LEFT RADIUS, UNSPECIFIED FRACTURE MORPHOLOGY, SEQUELA: ICD-10-CM

## 2024-04-29 DIAGNOSIS — Z47.89 ORTHOPEDIC AFTERCARE: ICD-10-CM

## 2024-04-29 DIAGNOSIS — S52.612S CLOSED DISPLACED FRACTURE OF STYLOID PROCESS OF LEFT ULNA, SEQUELA: ICD-10-CM

## 2024-04-29 PROCEDURE — 97110 THERAPEUTIC EXERCISES: CPT | Performed by: PHYSICAL THERAPIST

## 2024-04-29 PROCEDURE — 97035 APP MDLTY 1+ULTRASOUND EA 15: CPT | Performed by: PHYSICAL THERAPIST

## 2024-04-29 PROCEDURE — 97140 MANUAL THERAPY 1/> REGIONS: CPT | Performed by: PHYSICAL THERAPIST

## 2024-04-29 PROCEDURE — 97014 ELECTRIC STIMULATION THERAPY: CPT | Performed by: PHYSICAL THERAPIST

## 2024-04-29 NOTE — PROGRESS NOTES
Physical Therapy Daily Treatment Note  [facility] [place]  [address]  [facility] [address]      Patient: Mirlande Wiggins   : 1965  Diagnosis/ICD-10 Code:  Pain of left hip [M25.552]  Referring practitioner: Brent Horan MD  Date of Initial Visit: Type: THERAPY  Noted: 3/20/2024    Type: THERAPY  Noted: 2024  Today's Date: 2024  Patient seen for 10 sessions         Visit Diagnoses:    ICD-10-CM ICD-9-CM   1. Pain of left hip  M25.552 719.45   2. Greater trochanteric bursitis of left hip  M70.62 726.5   3. Closed fracture of distal end of left radius, unspecified fracture morphology, sequela  S52.502S 905.2   4. Closed displaced fracture of styloid process of left ulna, sequela  S52.612S 905.2   5. Left wrist pain  M25.532 719.43   6. Orthopedic aftercare  Z47.89 V54.9       Subjective   Mirlande Wiggins reports: left hip pain slightly improved.  Wrist/hand was very painful yesterday, scar was very painful. Today not as bad.    Objective   OBSERVATION: edema of hand decreased.  Slight elevation of wrist surgical scar.  PALPATION: Volar left wrist hand tender but far less severe.  Left lateral hip tender right greater trochanter bursa.  ROM: Left wrist flexion 35 degrees, extension 52 degrees.  STRENGTH: Left wrist extension 4-/5, wrist flexion 4-/5.  OTHER: Left hip Sebastien test positive.    See Exercise, Manual, and Modality Logs for complete treatment.       Assessment/Plan  Patient indicates her some improvement with her left hip greater trochanteric bursitis.  We are seeing gradual increases in range of motion of her left wrist.  Slight improvement in strength but is progressing very slowly.  Progress per Plan of Care and Progress strengthening /stabilization /functional activity           Timed:         Manual Therapy:    20     mins  49659;     Therapeutic Exercise:    27     mins  11920;     Neuromuscular Noel:        mins  61962;    Therapeutic Activity:           mins  13476;     Gait Training:           mins  01337;     Ultrasound:     15     mins  93471;    Ionto                                   mins   92941  Self Care                            mins   35676  Canalith Repos         mins 65715      Un-Timed:  Electrical Stimulation:    20     mins  14751 ( );  Dry Needling          mins self-pay  Traction          mins 35196      Timed Treatment:   63   mins   Total Treatment:     83   mins    Mark Louis, PT  [place] License: [id number]

## 2024-05-02 ENCOUNTER — TREATMENT (OUTPATIENT)
Dept: PHYSICAL THERAPY | Facility: CLINIC | Age: 59
End: 2024-05-02
Payer: COMMERCIAL

## 2024-05-02 DIAGNOSIS — S52.502S CLOSED FRACTURE OF DISTAL END OF LEFT RADIUS, UNSPECIFIED FRACTURE MORPHOLOGY, SEQUELA: ICD-10-CM

## 2024-05-02 DIAGNOSIS — M70.62 GREATER TROCHANTERIC BURSITIS OF LEFT HIP: ICD-10-CM

## 2024-05-02 DIAGNOSIS — M25.532 LEFT WRIST PAIN: ICD-10-CM

## 2024-05-02 DIAGNOSIS — M25.552 PAIN OF LEFT HIP: Primary | ICD-10-CM

## 2024-05-02 DIAGNOSIS — Z47.89 ORTHOPEDIC AFTERCARE: ICD-10-CM

## 2024-05-02 DIAGNOSIS — S52.612S CLOSED DISPLACED FRACTURE OF STYLOID PROCESS OF LEFT ULNA, SEQUELA: ICD-10-CM

## 2024-05-02 NOTE — PROGRESS NOTES
Physical Therapy Daily Treatment Note  [facility] [place]  [address] [facility] [address]  [facility] [address]      Patient: Mirlande Wiggins   : 1965  Diagnosis/ICD-10 Code:  Pain of left hip [M25.552]  Referring practitioner: Brent Horan MD  Date of Initial Visit: Type: THERAPY  Noted: 3/20/2024    Type: THERAPY  Noted: 2024  Today's Date: 2024  Patient seen for 11 sessions         Visit Diagnoses:    ICD-10-CM ICD-9-CM   1. Pain of left hip  M25.552 719.45   2. Greater trochanteric bursitis of left hip  M70.62 726.5   3. Closed fracture of distal end of left radius, unspecified fracture morphology, sequela  S52.502S 905.2   4. Closed displaced fracture of styloid process of left ulna, sequela  S52.612S 905.2   5. Left wrist pain  M25.532 719.43   6. Orthopedic aftercare  Z47.89 V54.9       Subjective   Mirlande Wiggins reports: Hip felt some better through yesterday, but it is hurting today.  Wrist is improving, but radial side of wrist has a pressure feeling in side.    Objective   OBSERVATION: Edema about the same as earlier in the week.  PALPATION: Volar left wrist hand tender but far less severe.  Mildly hypersensitive over the first dorsal compartment of the left wrist.  Left lateral hip tender right greater trochanter bursa.  ROM: Left wrist flexion 35 degrees, extension 50 degrees.  STRENGTH: Left wrist extension 4-/5, wrist flexion 4-/5.  OTHER: Left hip Sebastien test positive.  See Exercise, Manual, and Modality Logs for complete treatment.       Assessment/Plan  Overall patient is far less sensitive at the volar wrist.  She still has some paresthesia related to the median nerve compression that occurred during the fracture.  Her left greater trochanter bursitis symptoms are not resolved but appear to be improving.  Progress per Plan of Care and Progress strengthening /stabilization /functional activity           Timed:         Manual Therapy:    20     mins  14701;      Therapeutic Exercise:    28     mins  63877;     Neuromuscular Noel:        mins  32279;    Therapeutic Activity:          mins  91617;     Gait Training:           mins  50274;     Ultrasound:     15     mins  24798;    Ionto                                   mins   61518  Self Care                            mins   40842  Canalith Repos         mins 85251      Un-Timed:  Electrical Stimulation:    20     mins  94690 ( );  Dry Needling          mins self-pay  Traction          mins 94335      Timed Treatment:   63   mins   Total Treatment:     83   mins    Mark Louis, PT  [place] License: [id number]

## 2024-05-14 ENCOUNTER — TREATMENT (OUTPATIENT)
Dept: PHYSICAL THERAPY | Facility: CLINIC | Age: 59
End: 2024-05-14
Payer: COMMERCIAL

## 2024-05-14 DIAGNOSIS — M25.552 PAIN OF LEFT HIP: ICD-10-CM

## 2024-05-14 DIAGNOSIS — S52.612S CLOSED DISPLACED FRACTURE OF STYLOID PROCESS OF LEFT ULNA, SEQUELA: ICD-10-CM

## 2024-05-14 DIAGNOSIS — S52.502S CLOSED FRACTURE OF DISTAL END OF LEFT RADIUS, UNSPECIFIED FRACTURE MORPHOLOGY, SEQUELA: Primary | ICD-10-CM

## 2024-05-14 DIAGNOSIS — M25.532 LEFT WRIST PAIN: ICD-10-CM

## 2024-05-14 DIAGNOSIS — M70.62 GREATER TROCHANTERIC BURSITIS OF LEFT HIP: ICD-10-CM

## 2024-05-14 DIAGNOSIS — Z47.89 ORTHOPEDIC AFTERCARE: ICD-10-CM

## 2024-05-14 PROCEDURE — 97530 THERAPEUTIC ACTIVITIES: CPT | Performed by: PHYSICAL THERAPIST

## 2024-05-14 PROCEDURE — 97140 MANUAL THERAPY 1/> REGIONS: CPT | Performed by: PHYSICAL THERAPIST

## 2024-05-14 PROCEDURE — 97110 THERAPEUTIC EXERCISES: CPT | Performed by: PHYSICAL THERAPIST

## 2024-05-14 PROCEDURE — 97035 APP MDLTY 1+ULTRASOUND EA 15: CPT | Performed by: PHYSICAL THERAPIST

## 2024-05-14 PROCEDURE — 97033 APP MDLTY 1+IONTPHRSIS EA 15: CPT | Performed by: PHYSICAL THERAPIST

## 2024-05-14 NOTE — PROGRESS NOTES
Physical Therapy Progress note/Daily Treatment Note  [facility] [place] [facility] [place]  [address] [facility], [address] [address]      Patient: Mirlande Wiggins   : 1965  Diagnosis/ICD-10 Code:  Closed fracture of distal end of left radius, unspecified fracture morphology, sequela [S52.502S]  Referring practitioner: Brent Horan MD  Date of Initial Visit: Type: THERAPY  Noted: 3/20/2024    Type: THERAPY  Noted: 2024  Today's Date: 2024  Patient seen for 12 sessions         Visit Diagnoses:    ICD-10-CM ICD-9-CM   1. Closed fracture of distal end of left radius, unspecified fracture morphology, sequela  S52.502S 905.2   2. Closed displaced fracture of styloid process of left ulna, sequela  S52.612S 905.2   3. Left wrist pain  M25.532 719.43   4. Orthopedic aftercare  Z47.89 V54.9   5. Greater trochanteric bursitis of left hip  M70.62 726.5   6. Pain of left hip  M25.552 719.45       Subjective   Mirlande Wiggins reports: Wrist is improving, moving better, less painful. Using Vit E on scars. Left lateral hip pain with sitting, sit to stand, left side lying.  Walking is improving    Objective          Observations     Additional Wrist/Hand Observation Details  Less edema    Tenderness     Left Hip   Tenderness in the greater trochanter.     Active Range of Motion     Left Elbow   Forearm supination: 65 degrees   Forearm pronation: 75 degrees     Left Wrist   Wrist flexion: 20 degrees   Wrist extension: 34 degrees   Radial deviation: 10 degrees   Ulnar deviation: 20 degrees   Left Hip   Normal active range of motion    Passive Range of Motion     Left Wrist   Wrist flexion: 28 degrees   Wrist extension: 48 degrees     Strength/Myotome Testing     Left Wrist/Hand      (2nd hand position)   Left  strength (2nd hand position) 28 lbs    Thumb Strength  Key/Lateral Pinch     Trial 1: 9 lbs  Palmar/Three-Point Pinch     Trial 1: 7 lbs    Right Wrist/Hand      (2nd hand position)    Right  strength (2nd hand position) 71 lbs    Thumb Strength   Key/Lateral Pinch     Trial 1: 17 lbs  Palmar/Three-Point Pinch     Trial 1: 18 lbs        See Exercise, Manual, and Modality Logs for complete treatment.       Assessment/Plan  Patient's left lateral hip pain appears to be greater troches can Cesar bursitis.  This is improving but it is not resolved.  Sounds like walking is becoming more tolerable but sitting and laying on the left side still indicates bursa inflammation present.  Trial of iontophoresis today to see if this is effective.  Wrist active flexion extension is significantly limited.  However her passive wrist extension is functional.  Slight improvement in  strength,  strength and pinch strength are slowly improving.  She warrants additional physical therapy.  Goals have not been met.  Progress per Plan of Care and Progress strengthening /stabilization /functional activity         Access Code: [email or web address]  URL: [email or web address]  Date: 05/14/2024  Prepared by: Mark Louis    Exercises  - Wrist Extension with Resistance  - 1 x daily - 7 x weekly - 3 sets - 10 reps  - Wrist Flexion with Resistance  - 1 x daily - 7 x weekly - 3 sets - 10 reps  - Forearm Supination with Resistance  - 1 x daily - 7 x weekly - 3 sets - 10 reps  - Forearm Pronation with Resistance  - 1 x daily - 7 x weekly - 3 sets - 10 reps  Timed:         Manual Therapy:    20     mins  41187;     Therapeutic Exercise:    30     mins  80166;     Neuromuscular Noel:        mins  33159;    Therapeutic Activity:     15     mins  84804;     Gait Training:           mins  52636;     Ultrasound:     15     mins  09157;    Ionto                               10    mins   44966  Self Care                            mins   36944  Canalith Repos         mins 12741      Un-Timed:  Electrical Stimulation:         mins  16082 ( );  Dry Needling         mins self-pay  Traction          mins 38747      Timed  Treatment:   80   mins   Total Treatment:     80   mins    Mark Louis, PT  [place] License: [id number]

## 2024-05-24 ENCOUNTER — TREATMENT (OUTPATIENT)
Dept: PHYSICAL THERAPY | Facility: CLINIC | Age: 59
End: 2024-05-24
Payer: COMMERCIAL

## 2024-05-24 DIAGNOSIS — M25.552 PAIN OF LEFT HIP: ICD-10-CM

## 2024-05-24 DIAGNOSIS — M70.62 GREATER TROCHANTERIC BURSITIS OF LEFT HIP: ICD-10-CM

## 2024-05-24 DIAGNOSIS — S52.612S CLOSED DISPLACED FRACTURE OF STYLOID PROCESS OF LEFT ULNA, SEQUELA: ICD-10-CM

## 2024-05-24 DIAGNOSIS — S52.502S CLOSED FRACTURE OF DISTAL END OF LEFT RADIUS, UNSPECIFIED FRACTURE MORPHOLOGY, SEQUELA: Primary | ICD-10-CM

## 2024-05-24 DIAGNOSIS — Z47.89 ORTHOPEDIC AFTERCARE: ICD-10-CM

## 2024-05-24 DIAGNOSIS — M25.532 LEFT WRIST PAIN: ICD-10-CM

## 2024-05-24 PROCEDURE — 97035 APP MDLTY 1+ULTRASOUND EA 15: CPT | Performed by: PHYSICAL THERAPIST

## 2024-05-24 PROCEDURE — 97140 MANUAL THERAPY 1/> REGIONS: CPT | Performed by: PHYSICAL THERAPIST

## 2024-05-24 PROCEDURE — 97033 APP MDLTY 1+IONTPHRSIS EA 15: CPT | Performed by: PHYSICAL THERAPIST

## 2024-05-24 NOTE — PROGRESS NOTES
Physical Therapy Daily Treatment Note  [facility] [place] [facility]YSICAL [facility]ERAP[facility] [place]  [address] [facility], [facility] [address]  [facility] [address]      Patient: Mirlande Wiggins   : 1965  Diagnosis/ICD-10 Code:  Closed fracture of distal end of left radius, unspecified fracture morphology, sequela [S52.502S]  Referring practitioner: Brent Horan MD  Date of Initial Visit: Type: THERAPY  Noted: 3/20/2024    Type: THERAPY  Noted: 2024  Today's Date: 2024  Patient seen for 13 sessions         Visit Diagnoses:    ICD-10-CM ICD-9-CM   1. Closed fracture of distal end of left radius, unspecified fracture morphology, sequela  S52.502S 905.2   2. Closed displaced fracture of styloid process of left ulna, sequela  S52.612S 905.2   3. Left wrist pain  M25.532 719.43   4. Orthopedic aftercare  Z47.89 V54.9   5. Greater trochanteric bursitis of left hip  M70.62 726.5   6. Pain of left hip  M25.552 719.45       Subjective   Mirlande Wiggins reports: ionto was helpful with the lateral hip pain.  It is not gone but significantly improved.  Still some pain around the wrist.  Is playing piano better still cannot reach all the keys but she is moving her whole arm to reach the keys.    Objective   OBSERVATION: Less visible edema left lateral hip.  Minimal edema at left wrist and hand.  PALPATION: Greater trochanteric bursa left hip , 60% less severe.  Scar mobility of the wrist is improved.  ROM: Finger range of motion within normal limits.  Wrist extension 50 degrees, wrist flexion 32 degrees.  STRENGTH: Not assessed.  OTHER: Median nerve compression test negative.  Tinel's sign median nerve negative.    See Exercise, Manual, and Modality Logs for complete treatment.       Assessment/Plan  Left hip bursitis significantly improved with iontophoresis.  Wrist range of motion is slowly improving.  Overall hand function is improving.  I think range of motion could be better if patient can tolerate self motion a little  better.  Progress per Plan of Care and Progress strengthening /stabilization /functional activity MD note needed next visit.           Timed:         Manual Therapy:    35     mins  41734;     Therapeutic Exercise:         mins  92779;     Neuromuscular Noel:        mins  59942;    Therapeutic Activity:          mins  36455;     Gait Training:           mins  26298;     Ultrasound:     15     mins  20499;    Ionto                               10    mins   15514  Self Care                            mins   81069  Canalith Repos         mins 44553      Un-Timed:  Electrical Stimulation:         mins  53387 ( );  Dry Needling          mins self-pay  Traction          mins 74541      Timed Treatment:   60   mins   Total Treatment:     60   mins    Mark Louis, PT  [place] License: [id number]

## 2024-05-28 ENCOUNTER — TREATMENT (OUTPATIENT)
Dept: PHYSICAL THERAPY | Facility: CLINIC | Age: 59
End: 2024-05-28
Payer: COMMERCIAL

## 2024-05-28 DIAGNOSIS — Z47.89 ORTHOPEDIC AFTERCARE: ICD-10-CM

## 2024-05-28 DIAGNOSIS — S52.502S CLOSED FRACTURE OF DISTAL END OF LEFT RADIUS, UNSPECIFIED FRACTURE MORPHOLOGY, SEQUELA: Primary | ICD-10-CM

## 2024-05-28 DIAGNOSIS — M70.62 GREATER TROCHANTERIC BURSITIS OF LEFT HIP: ICD-10-CM

## 2024-05-28 DIAGNOSIS — M25.532 LEFT WRIST PAIN: ICD-10-CM

## 2024-05-28 DIAGNOSIS — M25.552 PAIN OF LEFT HIP: ICD-10-CM

## 2024-05-28 DIAGNOSIS — S52.612S CLOSED DISPLACED FRACTURE OF STYLOID PROCESS OF LEFT ULNA, SEQUELA: ICD-10-CM

## 2024-05-28 PROCEDURE — 97110 THERAPEUTIC EXERCISES: CPT | Performed by: PHYSICAL THERAPIST

## 2024-05-28 PROCEDURE — 97033 APP MDLTY 1+IONTPHRSIS EA 15: CPT | Performed by: PHYSICAL THERAPIST

## 2024-05-28 PROCEDURE — 97035 APP MDLTY 1+ULTRASOUND EA 15: CPT | Performed by: PHYSICAL THERAPIST

## 2024-05-28 PROCEDURE — 97140 MANUAL THERAPY 1/> REGIONS: CPT | Performed by: PHYSICAL THERAPIST

## 2024-05-28 NOTE — LETTER
Progress Note  5/28/2024  [facility], Brent Jade MD    Re: Mirlande Wiggins  ________________________________________________________________    Mirlande Wiggins, has attended 14 PT sessions.  Treatment has consisted of: AROM/AAROM, strengthening, joint mobilization, scar mobilization, HEP to wrist.  Modalities and stretching activities to left hip.     S:  Mirlande Wiggins states: Left wrist sore at the carpal areas, still fearful to push ROM due to discomfort.  Numbness in the long finger is resolved, still with numbness in the distal one half of the index finger and thumb.  Hip over 50% improved.  Has returned to playing piano.     O:  OBSERVATION: no significant edema of hand or wrist.  ROM: Left wrist (degrees):  flex=38, extension=58, RD=22, UD=12, supination 68. Full finger ROM.  STRENGTH:  Left=37 lbs, Right=75 lbs. Lateral Pinch left=8 lbs, right=13 lbs  SENSATION: Normal long finger proximal 1/2 of index finger. Abnormal distal 1/2 index and thumb distal to MP joint.  SPECIAL TESTS: Carpal Tunnel test negative.  ACTIVITY TOLERANCE: fine motor skills improving. 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  Walking/sleeping better with less hip pain now.     A: Left wrist Strength improving, 50% deficit now. ROM of wrist slowly improving, still is limited somewhat by pain avoidance. Overall hand function improving, she is playing piano again. Left hip improving.     P: Please advise after your exam.    Thank you for this referral.    Mark Louis, PT, CHT

## 2024-05-29 ENCOUNTER — OFFICE VISIT (OUTPATIENT)
Dept: ORTHOPEDIC SURGERY | Facility: CLINIC | Age: 59
End: 2024-05-29
Payer: COMMERCIAL

## 2024-05-29 DIAGNOSIS — Z87.81 STATUS POST OPEN REDUCTION AND INTERNAL FIXATION (ORIF) OF FRACTURE: Primary | ICD-10-CM

## 2024-05-29 DIAGNOSIS — Z98.890 STATUS POST OPEN REDUCTION AND INTERNAL FIXATION (ORIF) OF FRACTURE: Primary | ICD-10-CM

## 2024-05-29 NOTE — PROGRESS NOTES
Physical Therapy Daily Treatment Note  [facility] [place] [facility]ERAP[facility] [place]  [address] [facility], [facility] [address]  [facility] [address]      Patient: Mirlande Wiggins   : 1965  Diagnosis/ICD-10 Code:  Closed fracture of distal end of left radius, unspecified fracture morphology, sequela [S52.502S]  Referring practitioner: Brent Horan MD  Date of Initial Visit: Type: THERAPY  Noted: 3/20/2024    Type: THERAPY  Noted: 2024  Today's Date: 2024  Patient seen for 14 sessions         Visit Diagnoses:    ICD-10-CM ICD-9-CM   1. Closed fracture of distal end of left radius, unspecified fracture morphology, sequela  S52.502S 905.2   2. Closed displaced fracture of styloid process of left ulna, sequela  S52.612S 905.2   3. Left wrist pain  M25.532 719.43   4. Orthopedic aftercare  Z47.89 V54.9   5. Greater trochanteric bursitis of left hip  M70.62 726.5   6. Pain of left hip  M25.552 719.45       Subjective   Mirlande Wiggins reports: Left wrist sore at the carpal areas, still fearful to push ROM due to discomfort.  Numbness in the long finger is resolved, still with numbness in the distal one half of the index finger and thumb.  Hip over 50% improved.  Has returned to playing piano.    Objective          Tenderness     Left Hip   Tenderness in the ischial tuberosity (Less severe).     Active Range of Motion     Left Elbow   Forearm supination: 68 degrees   Forearm pronation: 90 degrees     Passive Range of Motion     Left Wrist   Wrist flexion: 38 degrees   Wrist extension: 58 degrees   Radial deviation: 22 degrees   Ulnar deviation: 12 degrees     Strength/Myotome Testing     Left Wrist/Hand      (2nd hand position)   Left  strength (2nd hand position) 37 lbs    Thumb Strength  Key/Lateral Pinch     Trial 1: 8 lbs    Right Wrist/Hand      (2nd hand position)   Right  strength (2nd hand position) 75 lbs    Thumb Strength   Key/Lateral Pinch     Trial 1: 13 lbs        See Exercise, Manual, and  Modality Logs for complete treatment.       Assessment/Plan  Left wrist Strength improving, 50% deficit now.  ROM of wrist slowly improving, still is limited somewhat by pain avoidance.  Overall hand function improving, she is playing piano again.  Left hip improving.  Progress per Plan of Care, Progress strengthening /stabilization /functional activity, and Awaiting MD orders           Timed:         Manual Therapy:    13     mins  92161;     Therapeutic Exercise:    33     mins  13348;     Neuromuscular Noel:        mins  00940;    Therapeutic Activity:          mins  90336;     Gait Training:           mins  75333;     Ultrasound:     15     mins  58597;    Ionto                               10    mins   65461  Self Care                            mins   00666  Canalith Repos         mins 36675      Un-Timed:  Electrical Stimulation:         mins  49606 ( );  Dry Needling          mins self-pay  Traction          mins 11235      Timed Treatment:   71   mins   Total Treatment:     71   mins    Mark Louis, PT  [place] License: [id number]

## 2024-05-29 NOTE — PROGRESS NOTES
[facility]     Follow-up Office Visit       Date: 05/29/2024   Patient Name: Mirlande Wiggins  MRN: [id number]  YOB: 1965    Chief Complaint:   Chief Complaint   Patient presents with    Post-op     6 week recheck - status post Left carpal tunnel syndrome - 3/12/25  &  Left Open reduction internal fixation of intra-articular distal radius fracture > 3 pieces - 3/6/24           History of Present Illness:   Mirlande Wiggins is a 58 y.o. female presents 3-month follow-up status post left distal radius ORIF and left carpal tunnel release.  She been doing well since her last visit.  Reports that she has improvement in her sensation in her left hand.  Reports that she still has some numbness at the distal tip of her thumb and index finger however it is otherwise normal.  Reports that her left hand strength has improved.  Has been working with therapy and reports some mild left wrist pain with use but otherwise no concerns.      Subjective   Review of Systems:   Review of Systems     Pertinent review of systems per HPI    I reviewed the patient's chief complaint, history of present illness, review of systems, past medical history, surgical history, family history, social history, medications and allergy list in the EMR on 05/29/2024 and agree with the findings above.    Objective    Vital Signs: There were no vitals filed for this visit.  BMI: There is no height or weight on file to calculate BMI.     General Appearance: No acute distress. Alert and oriented.     Chest:  Non-labored breathing on room air      Ortho Exam:  Left carpal tunnel incision healing well.  Left distal radius incision healing well.  Sensation intact to light touch in the median and ulnar nerve distributions except for slightly distal diminished in the left thumb distal to the mid proximal phalanx as well as the index finger distal to the DIP flexion crease.   Otherwise intact.  Nontender palpation of the distal radius.   Fingers warm and well-perfused distally      Imaging/Studies:   Imaging Results (Last 24 Hours)       Procedure Component Value Units Date/Time    XR Wrist 3+ View Left [[id number]] Resulted: 05/29/24 1036     Updated: 05/29/24 1040    Narrative:      Left Wrist X-Ray    Indication: s/p ORIF    Views:  AP, Lateral, and Oblique     Comparison: 4/15/24    Findings:  Patient is status post ORIF of the distal radius.  Alignment remains stable.  The fracture appears to be healing appropriately.  New bone is visualized.    The hardware is intact.    Normal soft tissues.  Normal joint spaces      Impression:  X-ray of the left wrist personally reviewed by me.  Fracture   of the left distal radius s/p ORIF with interval healing.              Procedures:  Procedures    Quality Measures:   ACP:   ACP discussion was deferred.    Tobacco:   Mirlande Wiggins  reports that she has never smoked. She has never used smokeless tobacco.    Assessment / Plan    Assessment/Plan:      Diagnosis Plan   1. Status post open reduction and internal fixation (ORIF) of fracture  XR Wrist 3+ View Left    Ambulatory Referral to Physical Therapy for Evaluation & Treatment          Patient presents now proximately 10 weeks status post left distal radius ORIF and carpal tunnel release.  She has been doing well with therapy and has significant improvements in her left hand pain, range of motion as well as her median nerve paresthesias.  Her x-rays do demonstrate some collapse of her distal radius fracture however the plate is still well-positioned and the screws remain extra-articular.  There is evidence of significant bony callus formation as well.  Recommend repeat x-rays at next visit.  recommend continue hand therapy for aggressive range of motion of the left wrist and hand.  Follow-up with me in 6 weeks.    Follow Up:   Return in about 6 weeks (around 7/10/2024).        Brent Jade  MD Marline  [facility] [place] [facility] Sur[facility]

## 2024-05-30 ENCOUNTER — TREATMENT (OUTPATIENT)
Dept: PHYSICAL THERAPY | Facility: CLINIC | Age: 59
End: 2024-05-30
Payer: COMMERCIAL

## 2024-05-30 DIAGNOSIS — M70.62 GREATER TROCHANTERIC BURSITIS OF LEFT HIP: ICD-10-CM

## 2024-05-30 DIAGNOSIS — M25.552 PAIN OF LEFT HIP: ICD-10-CM

## 2024-05-30 DIAGNOSIS — S52.502S CLOSED FRACTURE OF DISTAL END OF LEFT RADIUS, UNSPECIFIED FRACTURE MORPHOLOGY, SEQUELA: Primary | ICD-10-CM

## 2024-05-30 DIAGNOSIS — S52.612S CLOSED DISPLACED FRACTURE OF STYLOID PROCESS OF LEFT ULNA, SEQUELA: ICD-10-CM

## 2024-05-30 DIAGNOSIS — M25.532 LEFT WRIST PAIN: ICD-10-CM

## 2024-05-30 DIAGNOSIS — Z47.89 ORTHOPEDIC AFTERCARE: ICD-10-CM

## 2024-05-30 NOTE — PROGRESS NOTES
Physical Therapy Daily Treatment Note  [facility] [place] [facility] [place]  [address] [facility] [facility] [address]  [facility] [address]      Patient: Mirlande Wiggins   : 1965  Diagnosis/ICD-10 Code:  Closed fracture of distal end of left radius, unspecified fracture morphology, sequela [S52.502S]  Referring practitioner: Brent Horan MD  Date of Initial Visit: Type: THERAPY  Noted: 3/20/2024    Type: THERAPY  Noted: 2024  Today's Date: 2024  Patient seen for 15 sessions         Visit Diagnoses:    ICD-10-CM ICD-9-CM   1. Closed fracture of distal end of left radius, unspecified fracture morphology, sequela  S52.502S 905.2   2. Closed displaced fracture of styloid process of left ulna, sequela  S52.612S 905.2   3. Left wrist pain  M25.532 719.43   4. Orthopedic aftercare  Z47.89 V54.9   5. Greater trochanteric bursitis of left hip  M70.62 726.5   6. Pain of left hip  M25.552 719.45       Subjective   Mirlande Wiggins reports: Left lateral hip pain has improved about 50% with PT, has plateau.  Wrist, fearful she going to damage plate and wrist, so she does not want to use it a lot.  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    Objective   OBSERVATION: Edema over Left GT bursa, significantly decreased.  No wrist edema.  PALPATION: Left GT bursa area tender, piriformis attachment site tender at GT process tender, but bursa most tender area.  LEFT HIP ROM: WNL in all planes of motion.  ROM: Left Wrist   Wrist flexion: 42 degrees   Wrist extension: 61 degrees   Radial deviation: 22 degrees   Ulnar deviation: 12 degrees   Finger: Thumb tip to SF tip abduction left is ~ 2 cm less than right.  STRENGTH: Left abduction 5/5, painful.  OTHER: WE HAVE HAD THIS DISCUSSION MULTIPLE TIMES WITH PATIENT.  SHE IS NOT GOING TO DAMAGE THE PLATE, DOING MORE FUNCTIONAL HARM BY NOT USING HER HAND/WRIST.    See Exercise, Manual, and Modality Logs for complete treatment.   Access Code: [email or web address]  URL:  [email or web address]  Date: 05/30/2024  Prepared by: Mark Louis    Exercises  - Wrist Prayer Stretch at Table  - 10 x daily - 7 x weekly - 1 sets - 2 reps - 30 seconds hold  - Seated Reverse Wrist Prayer Stretch  - 10 x daily - 7 x weekly - 1 sets - 2 reps - 30 seconds hold  - Finger Spreading  - 5-10 x daily - 7 x weekly - 1 sets - 10 reps - 10 seconds hold    Assessment/Plan  Left lateral hip pain/bursitis injured when she fell and fx wrist, has improved some with PT, but seems to have plateau.  She may be a good candidate for injection.  Progress per Plan of Care and Progress strengthening /stabilization /functional activity           Timed:         Manual Therapy:    25     mins  68157;     Therapeutic Exercise:    16     mins  71042;     Neuromuscular Noel:        mins  81635;    Therapeutic Activity:          mins  69231;     Gait Training:           mins  76163;     Ultrasound:    15      mins  13608;    Ionto                                   mins   79672  Self Care                            mins   79889  Canalith Repos         mins 72442      Un-Timed:  Electrical Stimulation:         mins  25422 ( );  Dry Needling          mins self-pay  Traction          mins 11724      Timed Treatment:   56   mins   Total Treatment:     56   mins    Mark Louis, PT  [place] License: [id number]

## 2024-05-31 ENCOUNTER — TELEPHONE (OUTPATIENT)
Dept: ORTHOPEDIC SURGERY | Facility: CLINIC | Age: 59
End: 2024-05-31
Payer: COMMERCIAL

## 2024-05-31 NOTE — TELEPHONE ENCOUNTER
Called patient and left message to call back to schedule a new problem visit with a provider that sees for hip pain. If patient calls back please transfer her to Harshil-supervisor.

## 2024-06-03 ENCOUNTER — TREATMENT (OUTPATIENT)
Dept: PHYSICAL THERAPY | Facility: CLINIC | Age: 59
End: 2024-06-03
Payer: COMMERCIAL

## 2024-06-03 DIAGNOSIS — M70.62 GREATER TROCHANTERIC BURSITIS OF LEFT HIP: ICD-10-CM

## 2024-06-03 DIAGNOSIS — M25.552 PAIN OF LEFT HIP: ICD-10-CM

## 2024-06-03 DIAGNOSIS — M25.532 LEFT WRIST PAIN: ICD-10-CM

## 2024-06-03 DIAGNOSIS — S52.502S CLOSED FRACTURE OF DISTAL END OF LEFT RADIUS, UNSPECIFIED FRACTURE MORPHOLOGY, SEQUELA: Primary | ICD-10-CM

## 2024-06-03 DIAGNOSIS — Z47.89 ORTHOPEDIC AFTERCARE: ICD-10-CM

## 2024-06-03 DIAGNOSIS — S52.612S CLOSED DISPLACED FRACTURE OF STYLOID PROCESS OF LEFT ULNA, SEQUELA: ICD-10-CM

## 2024-06-03 PROCEDURE — 97530 THERAPEUTIC ACTIVITIES: CPT | Performed by: PHYSICAL THERAPIST

## 2024-06-03 PROCEDURE — 97140 MANUAL THERAPY 1/> REGIONS: CPT | Performed by: PHYSICAL THERAPIST

## 2024-06-03 PROCEDURE — 97110 THERAPEUTIC EXERCISES: CPT | Performed by: PHYSICAL THERAPIST

## 2024-06-03 NOTE — PROGRESS NOTES
Physical Therapy Daily Treatment Note  [facility] [place] [facility] [place]  [address] [facility], [facility] [address]  [facility] [address]      Patient: Mirlande Wiggins   : 1965  Diagnosis/ICD-10 Code:  Closed fracture of distal end of left radius, unspecified fracture morphology, sequela [S52.502S]  Referring practitioner: Brent Horan MD  Date of Initial Visit: Type: THERAPY  Noted: 3/20/2024  Today's Date: 6/3/2024  Patient seen for 16 sessions         Visit Diagnoses:    ICD-10-CM ICD-9-CM   1. Closed fracture of distal end of left radius, unspecified fracture morphology, sequela  S52.502S 905.2   2. Closed displaced fracture of styloid process of left ulna, sequela  S52.612S 905.2   3. Left wrist pain  M25.532 719.43   4. Orthopedic aftercare  Z47.89 V54.9   5. Greater trochanteric bursitis of left hip  M70.62 726.5   6. Pain of left hip  M25.552 719.45       Subjective   Mirlande Wiggins reports: He is purposely stretching the wrist more and feels like it is helping.  Less fear about stretching the wrist now.  Was able to play PNO well over the weekend.  Left lateral hip pain is about the same.    Objective   OBSERVATION: Slight edema left wrist.  Minimal edema at left lateral hip.  PALPATION: Left dorsal carpals moderately tender but significantly less severe.  Left lateral hip greater trochanteric bursa tender  ROM: Wrist flexion 50 degrees, wrist extension 71 degrees, supination 70 degrees.  Left hip range of motion within normal limits  GAIT: Normal    See Exercise, Manual, and Modality Logs for complete treatment.       Assessment/Plan  Chronic left lateral hip pain, appears to be greater trochanteric bursitis.  Has improved some with physical therapy but has plateaued of late.  Left wrist range of motion is made significant improvement in the past week..  She is being more intentional with her home program.  Progress per Plan of Care and Progress strengthening /stabilization /functional activity            Timed:         Manual Therapy:    24     mins  57832;     Therapeutic Exercise:    16     mins  73314;     Neuromuscular Noel:        mins  95133;    Therapeutic Activity:     16     mins  23255;     Gait Training:           mins  64578;     Ultrasound:          mins  30996;    Ionto                                   mins   19726  Self Care                            mins   48475  Canalith Repos         mins 42344      Un-Timed:  Electrical Stimulation:         mins  98054 ( );  Dry Needling          mins self-pay  Traction          mins 03278      Timed Treatment:   54   mins   Total Treatment:     54   mins    Mark Louis, PT  [place] License: [id number]

## 2024-06-06 ENCOUNTER — OFFICE VISIT (OUTPATIENT)
Dept: ORTHOPEDIC SURGERY | Facility: CLINIC | Age: 59
End: 2024-06-06
Payer: COMMERCIAL

## 2024-06-06 VITALS
HEIGHT: 66 IN | BODY MASS INDEX: 21.05 KG/M2 | DIASTOLIC BLOOD PRESSURE: 72 MMHG | SYSTOLIC BLOOD PRESSURE: 118 MMHG | WEIGHT: 131 LBS

## 2024-06-06 DIAGNOSIS — M70.62 TROCHANTERIC BURSITIS OF LEFT HIP: Primary | ICD-10-CM

## 2024-06-06 DIAGNOSIS — M16.12 PRIMARY OSTEOARTHRITIS OF LEFT HIP: ICD-10-CM

## 2024-06-06 RX ORDER — TRIAMCINOLONE ACETONIDE 40 MG/ML
80 INJECTION, SUSPENSION INTRA-ARTICULAR; INTRAMUSCULAR
Status: COMPLETED | OUTPATIENT
Start: 2024-06-06 | End: 2024-06-06

## 2024-06-06 RX ORDER — BUPIVACAINE HYDROCHLORIDE 2.5 MG/ML
3 INJECTION, SOLUTION EPIDURAL; INFILTRATION; INTRACAUDAL
Status: COMPLETED | OUTPATIENT
Start: 2024-06-06 | End: 2024-06-06

## 2024-06-06 RX ORDER — LIDOCAINE HYDROCHLORIDE 10 MG/ML
3 INJECTION, SOLUTION EPIDURAL; INFILTRATION; INTRACAUDAL; PERINEURAL
Status: COMPLETED | OUTPATIENT
Start: 2024-06-06 | End: 2024-06-06

## 2024-06-06 RX ADMIN — TRIAMCINOLONE ACETONIDE 80 MG: 40 INJECTION, SUSPENSION INTRA-ARTICULAR; INTRAMUSCULAR at 08:19

## 2024-06-06 RX ADMIN — BUPIVACAINE HYDROCHLORIDE 3 ML: 2.5 INJECTION, SOLUTION EPIDURAL; INFILTRATION; INTRACAUDAL at 08:19

## 2024-06-06 RX ADMIN — LIDOCAINE HYDROCHLORIDE 3 ML: 10 INJECTION, SOLUTION EPIDURAL; INFILTRATION; INTRACAUDAL; PERINEURAL at 08:19

## 2024-06-06 NOTE — PROGRESS NOTES
Procedure   - Large Joint Arthrocentesis: L greater trochanteric bursa on 6/6/2024 8:19 AM  Indications: pain  Details: 22 G needle, lateral approach  Medications: 3 mL bupivacaine (PF) 0.25 %; 3 mL lidocaine PF 1% 1 %; 80 mg triamcinolone acetonide 40 MG/ML  Outcome: tolerated well, no immediate complications  Procedure, treatment alternatives, risks and benefits explained, specific risks discussed. Consent was given by the patient. Immediately prior to procedure a time out was called to verify the correct patient, procedure, equipment, support staff and site/side marked as required. Patient was prepped and draped in the usual sterile fashion.

## 2024-06-06 NOTE — PROGRESS NOTES
Orthopaedic Clinic Note: Hip Established Patient    Chief Complaint   Patient presents with    Left Hip - Pain        HPI    Mirlande Wiggins is a 58 y.o. female who presents with left hip pain for 3 month(s). Onset mechanical fall. Pain is localized to lateral trochanter and is a 3/10 on the pain scale.Pain is described as dull and aching. Associated symptoms include pain and stiffness.  The pain is worse with walking, climbing stairs, lying on affected side, and rising from seated position; lying down on right side improves the pain. Previous treatments have included: physical therapy since symptom onset. Although some transient relief was reported with these interventions, these conservative measures have failed and symptoms have persisted. The patient is limited in daily activities and has had a significant decrease in quality of life as a result. She denies fevers, chills, or constitutional symptoms.    I have reviewed the following portions of the patient's history:History of Present Illness    Past Medical History:   Diagnosis Date    Anxiety     Fibromyalgia     Hypothyroid       Past Surgical History:   Procedure Laterality Date    CARPAL TUNNEL RELEASE Left 03/12/2024    Dr Horan    CERVICAL DISC SURGERY      ORIF WRIST FRACTURE Left 03/06/2024    Dr Horan      Family History   Problem Relation Age of Onset    Heart disease Mother     Heart disease Father     Heart attack Father      Social History     Socioeconomic History    Marital status:    Tobacco Use    Smoking status: Never    Smokeless tobacco: Never   Vaping Use    Vaping status: Never Used   Substance and Sexual Activity    Alcohol use: Never    Drug use: Never    Sexual activity: Defer      Current Outpatient Medications on File Prior to Visit   Medication Sig Dispense Refill    CALCIUM PO Take  by mouth Daily.      Cholecalciferol 25 MCG (1000 UT) tablet Take 1 tablet by mouth Daily.      clonazePAM (KlonoPIN) 1 MG tablet Every Night.    "   fluticasone (FLONASE) 50 MCG/ACT nasal spray       levothyroxine (SYNTHROID, LEVOTHROID) 50 MCG tablet Daily.      MAGNESIUM CITRATE PO Take  by mouth.      multivitamin (MULTIVITAMIN PO) Take  by mouth Daily.      multivitamin with minerals (PRESERVISION AREDS PO) Take 1 tablet by mouth Daily.      Omega-3 Fatty Acids (fish oil) 1000 MG capsule capsule Fish Oil   1200 MG 1 DAILY      senna 8.6 MG tablet Take 1 tablet by mouth Daily.      valACYclovir (VALTREX) 1000 MG tablet As Needed.      [DISCONTINUED] docusate sodium (Colace) 100 MG capsule Take 1 capsule by mouth 2 (Two) Times a Day As Needed for Constipation. 62 capsule 0    [DISCONTINUED] HYDROcodone-acetaminophen (NORCO) 5-325 MG per tablet Take 1 tablet by mouth Every 6 (Six) Hours As Needed for Moderate Pain. 12 tablet 0    [DISCONTINUED] oxyCODONE (ROXICODONE) 5 MG immediate release tablet Take 1 tablet by mouth Every 6 (Six) Hours As Needed for Severe Pain. 15 tablet 0     No current facility-administered medications on file prior to visit.      Allergies   Allergen Reactions    Sodium Fluoride Other (See Comments)     CREST TOOTHPASTE, IRRITATES THE GINGIVA, BLISTERS.    Latex Unknown (See Comments)    Pollen Extract Unknown (See Comments)    Shellfish Allergy Unknown (See Comments)        Review of Systems   Constitutional: Negative.    HENT: Negative.     Eyes: Negative.    Respiratory: Negative.     Cardiovascular: Negative.    Gastrointestinal: Negative.    Endocrine: Negative.    Genitourinary: Negative.    Musculoskeletal:  Positive for arthralgias.   Skin: Negative.    Allergic/Immunologic: Negative.    Neurological: Negative.    Hematological: Negative.    Psychiatric/Behavioral: Negative.          The patient's Review of Systems was personally reviewed and confirmed as accurate.    Physical Exam  Blood pressure 118/72, height 167.6 cm (65.98\"), weight 59.4 kg (131 lb).    Body mass index is 21.15 kg/m².    GENERAL APPEARANCE: awake, alert, " oriented, in no acute distress and well developed, well nourished  LUNGS:  breathing nonlabored  EXTREMITIES: no clubbing, cyanosis  PERIPHERAL PULSES: palpable dorsalis pedis and posterior tibial pulses bilaterally.    GAIT:  Antalgic            Hip Exam:  Left    RANGE OF MOTION:  EXTENSION/FLEXION:  normal (0-110 degrees)  IR (at 90 degrees of flexion):  20  ER (at 90 degrees of flexion):  40  PAIN WITH HIP MOTION:  no  PAIN WITH LOGROLL:  no     STINCFIELD TEST: negative    STRENGTH:  ABDUCTOR:  5/5  ADDUCTOR:  5/5  HIP FLEXION:  5/5    GREATER TROCHANTER BURSAL PAIN:  yes    SENSATION TO LIGHT TOUCH:  DEEP PERONEAL/SUPERFICIAL PERONEAL/SURAL/SAPHENOUS/TIBIAL:   intact    EDEMA:  no  ERYTHEMA:  no  WOUNDS/INCISIONS:   no  _________________________________________________________________  _________________________________________________________________    RADIOGRAPHIC FINDINGS:   AP pelvis and left hip are x-rays from 3/1/2024 personally reviewed and interpreted.  Radiographs demonstrate mild arthritic changes of the bilateral hips with preservation of the joint spaces.  No acute bony injury or fracture.    Assessment/Plan:   Diagnosis Plan   1. Trochanteric bursitis of left hip        2. Primary osteoarthritis of left hip          Patient has mild arthritis of the hips that is asymptomatic on exam today with well-preserved range of motion.  She is primarily suffering from trochanteric bursitis.  I discussed treatment options.  She is agreeable to trochanteric bursa cortisone injection today.  She will follow-up as needed.    Procedure Note:  I discussed with the patient the potential benefits of performing a therapeutic injection of the left hip trochanteric bursa as well as potential risks including but not limited to infection, swelling, pain, bleeding, bruising, nerve/vessel damage, skin color changes, transient elevation in blood glucose levels, and fat atrophy. After informed consent and verifying correct  patient, procedure site, and type of procedure, the area was prepped with alcohol, ethyl chloride was used to numb the skin. Via the direct lateral approach, 3 cc of 1% lidocaine, 3 cc of 0.25% Marcaine and 2 cc of 40mg/ml of Kenalog were injected into the left hip trochanteric bursa. The patient tolerated the procedure well. There were no complications. A sterile dressing was placed over the injection site.      David Byrd MD  06/06/24  08:19 EDT

## 2024-06-19 ENCOUNTER — TREATMENT (OUTPATIENT)
Dept: PHYSICAL THERAPY | Facility: CLINIC | Age: 59
End: 2024-06-19
Payer: COMMERCIAL

## 2024-06-19 DIAGNOSIS — M25.532 LEFT WRIST PAIN: ICD-10-CM

## 2024-06-19 DIAGNOSIS — Z47.89 ORTHOPEDIC AFTERCARE: ICD-10-CM

## 2024-06-19 DIAGNOSIS — S52.502S CLOSED FRACTURE OF DISTAL END OF LEFT RADIUS, UNSPECIFIED FRACTURE MORPHOLOGY, SEQUELA: Primary | ICD-10-CM

## 2024-06-19 DIAGNOSIS — S52.612S CLOSED DISPLACED FRACTURE OF STYLOID PROCESS OF LEFT ULNA, SEQUELA: ICD-10-CM

## 2024-06-19 NOTE — PROGRESS NOTES
Physical Therapy Daily Treatment Note  [facility] [place]  [address]  [facility] [place] [address]      Patient: Mirlande Wiggins   : 1965  Diagnosis/ICD-10 Code:  Closed fracture of distal end of left radius, unspecified fracture morphology, sequela [S52.502S]  Referring practitioner: Brent Horan MD  Date of Initial Visit: Type: THERAPY  Noted: 3/20/2024  Today's Date: 2024  Patient seen for 17 sessions         Visit Diagnoses:    ICD-10-CM ICD-9-CM   1. Closed fracture of distal end of left radius, unspecified fracture morphology, sequela  S52.502S 905.2   2. Closed displaced fracture of styloid process of left ulna, sequela  S52.612S 905.2   3. Left wrist pain  M25.532 719.43   4. Orthopedic aftercare  Z47.89 V54.9       Subjective   Mirlande Wiggins reports: Patient reports that left hip is doing well.  Still with numbness into the first 3 digits of the surgical hand    Objective          Observations     Left Wrist/Hand   Positive for adhesive scar.       Tenderness     Additional Tenderness Details  Left CTR tender 1.5 on scale 1/10    Active Range of Motion     Left Wrist   Wrist flexion: 40 degrees   Wrist extension: 52 degrees     Passive Range of Motion     Left Wrist   Wrist flexion: 50 degrees   Wrist extension: 68 degrees     Strength/Myotome Testing     Left Wrist/Hand      (2nd hand position)   Left  strength (2nd hand position) 42 lbs    Right Wrist/Hand      (2nd hand position)   Right  strength (2nd hand position) 68 lbs        See Exercise, Manual, and Modality Logs for complete treatment.       Assessment/Plan  Patient responded positively to left hip injection.  At this point does not appear to be need to be addressing hip.  She is showing active signs and subjective reports of improvement with wrist.  Patient range of motion for extension to date.  Think is the best  strength to date.  Range of motion and strength still has  deficits that need to be addressed.  Progress per Plan of Care and Progress strengthening /stabilization /functional activity decrease to 1 time weekly.           Timed:         Manual Therapy:    16     mins  28556;     Therapeutic Exercise:    31     mins  20830;     Neuromuscular Noel:        mins  90864;    Therapeutic Activity:          mins  58635;     Gait Training:           mins  83377;     Ultrasound:     13     mins  53847;    Ionto                                   mins   23491  Self Care                            mins   70477  Canalith Repos         mins 04720      Un-Timed:  Electrical Stimulation:         mins  47298 ( );  Dry Needling          mins self-pay  Traction          mins 23531      Timed Treatment:   60   mins   Total Treatment:     60   mins    Mark Louis PT, CHT  [place] License: [id number]

## 2024-06-24 ENCOUNTER — TREATMENT (OUTPATIENT)
Dept: PHYSICAL THERAPY | Facility: CLINIC | Age: 59
End: 2024-06-24
Payer: COMMERCIAL

## 2024-06-24 DIAGNOSIS — M25.532 LEFT WRIST PAIN: ICD-10-CM

## 2024-06-24 DIAGNOSIS — S52.612S CLOSED DISPLACED FRACTURE OF STYLOID PROCESS OF LEFT ULNA, SEQUELA: ICD-10-CM

## 2024-06-24 DIAGNOSIS — Z47.89 ORTHOPEDIC AFTERCARE: ICD-10-CM

## 2024-06-24 DIAGNOSIS — S52.502S CLOSED FRACTURE OF DISTAL END OF LEFT RADIUS, UNSPECIFIED FRACTURE MORPHOLOGY, SEQUELA: Primary | ICD-10-CM

## 2024-06-24 NOTE — PROGRESS NOTES
Physical Therapy Progress Note/Daily Treatment Note  [facility] [place]  [address]  [facility] [address]      Patient: Mirlande Wiggins   : 1965  Diagnosis/ICD-10 Code:  Closed fracture of distal end of left radius, unspecified fracture morphology, sequela [S52.502S]  Referring practitioner: Brent Horan MD  Date of Initial Visit: Type: THERAPY  Noted: 3/20/2024  Today's Date: 2024  Patient seen for 18 sessions         Visit Diagnoses:    ICD-10-CM ICD-9-CM   1. Closed fracture of distal end of left radius, unspecified fracture morphology, sequela  S52.502S 905.2   2. Closed displaced fracture of styloid process of left ulna, sequela  S52.612S 905.2   3. Left wrist pain  M25.532 719.43   4. Orthopedic aftercare  Z47.89 V54.9       Subjective   Mirlande Wiggins reports: Deep hand and into index finger, numbness is constant and exhausting.  With arm out to side, and wrist extended nerve pain to medial left upper arm.  Wrist is stiff but is improving.    Objective          Tenderness     Additional Tenderness Details  Volar wrist/carpal tunnel area.    Active Range of Motion     Left Wrist   Wrist flexion: 44 degrees   Wrist extension: 68 degrees   Radial deviation: 17 degrees   Ulnar deviation: 22 degrees     Strength/Myotome Testing     Left Wrist/Hand   Wrist extension: 4+  Wrist flexion: 4+     (2nd hand position)   Left  strength (2nd hand position) 43 lbs    Thumb Strength  Key/Lateral Pinch     Trial 1: 11 lbs    Right Wrist/Hand      (2nd hand position)   Right  strength (2nd hand position) 66 lbs    Thumb Strength   Key/Lateral Pinch     Trial 1: 15 lbs    Tests   Cervical     Left   Positive ULTT1 and ULTT2.     Left Elbow   Negative Tinel's sign (cubital tunnel).     Left Wrist/Hand   Negative extrinsic extensor tightness, extrinsic flexor tightness, Phalen's sign and Tinel's sign (medial nerve).       QUICK DASH SCORE: improved from 91%  to 43%  See Exercise, Manual, and Modality Logs for complete treatment.       Assessment & Plan       Assessment  Impairments: abnormal or restricted ROM, impaired physical strength and pain with function   Assessment details: Pt. presents with a non-work related injury to the left wrist on 03/1/2024. She experienced a fall on outstretched hands at home that lead to a left distal radius fracture & ulnar styloid process fracture. She had surgery on 3/6/2024 for ORIF of radius, and a CTR on 3/12/2024 after developing severe numbness post injury. She is presenting with hypersensitivity along median nerve distribution.Problems: decreased ROM, decreased strength, decreased function, and pain .  Overall patient is improving.  To QuickDASH score has improved from 91% to 43%.  Warrants continuation of physical therapy.    Goals  Plan Goals: Goal Progress:  STG to be met in 4 weeks  1. Pt. reports 3/10 pain with wrist movement. MET  2. Pt. Functional  on the left increase to 15 lbs. MET  3. Pt. has a >5 lb increase in lateral, and 3 point strength. MET  4. Pt. has a increase of >20 degrees in wrist flexion and extension. MET  5. Pt. has a increase of >35 degrees in forearm supination/pronation. MET  6. Pt has improved function so that Quick Dash score improves to 60%. MET  LTG to be met in 12 weeks  1. Pt reports 1/10 pain with wrist movement. NOT MET  2. Pt. is able to  45 lbs without wrist pain. NOT MET  3. Pt. is able to complete ADLs independently. MET  4. Pt. has within normal limits in wrist flex/ext, ulnar/radial deviation, supination/pronation.  PARTIALLY MET  5. Pt. is independent with HEP. NOT MET  6. Pt has improved function so that Quick Dash score improves to 30%. NOT MET      Plan  Planned modality interventions: ultrasound, high voltage pulsed current (pain management) and dry needling  Planned therapy interventions: fine motor coordination training, functional ROM exercises, home exercise program,  joint mobilization, therapeutic activities, stretching, strengthening, soft tissue mobilization, neuromuscular re-education and manual therapy  Frequency: 1x week  Duration in weeks: 6                   Timed:         Manual Therapy:    30     mins  80841;     Therapeutic Exercise:    20     mins  43189;     Neuromuscular Noel:        mins  09298;    Therapeutic Activity:          mins  63003;     Gait Training:           mins  09563;     Ultrasound:    13      mins  53402;    Ionto                                   mins   57589  Self Care                            mins   49892  Canalith Repos         mins 32954      Un-Timed:  Electrical Stimulation:         mins  96851 ( );  Dry Needling          mins self-pay  Traction          mins 12157      Timed Treatment:   63   mins   Total Treatment:     63   mins    Mark Louis PT, CHT  [place] License: [id number]

## 2024-07-01 ENCOUNTER — TREATMENT (OUTPATIENT)
Dept: PHYSICAL THERAPY | Facility: CLINIC | Age: 59
End: 2024-07-01
Payer: COMMERCIAL

## 2024-07-01 DIAGNOSIS — S52.502S CLOSED FRACTURE OF DISTAL END OF LEFT RADIUS, UNSPECIFIED FRACTURE MORPHOLOGY, SEQUELA: Primary | ICD-10-CM

## 2024-07-01 DIAGNOSIS — Z47.89 ORTHOPEDIC AFTERCARE: ICD-10-CM

## 2024-07-01 DIAGNOSIS — M25.532 LEFT WRIST PAIN: ICD-10-CM

## 2024-07-01 DIAGNOSIS — S52.612S CLOSED DISPLACED FRACTURE OF STYLOID PROCESS OF LEFT ULNA, SEQUELA: ICD-10-CM

## 2024-07-01 NOTE — PROGRESS NOTES
Physical Therapy Daily Treatment Note  [facility] [place]  [address] [facility] [address]  [facility] [address]      Patient: Mirlande Wiggins   : 1965  Diagnosis/ICD-10 Code:  Closed fracture of distal end of left radius, unspecified fracture morphology, sequela [S52.502S]  Referring practitioner: Brent Horan MD  Date of Initial Visit: Type: THERAPY  Noted: 3/20/2024  Today's Date: 2024  Patient seen for 19 sessions         Visit Diagnoses:    ICD-10-CM ICD-9-CM   1. Closed fracture of distal end of left radius, unspecified fracture morphology, sequela  S52.502S 905.2   2. Closed displaced fracture of styloid process of left ulna, sequela  S52.612S 905.2   3. Left wrist pain  M25.532 719.43   4. Orthopedic aftercare  Z47.89 V54.9       Subjective   Mirlande Wiggins reports: Left wrist/hand is improving, sleeping some better, the clonazePAM seems to help with nerve pain.  Forearm feels stiff this week.    Objective   OBSERVATION: no visible edema  PALPATION: Mild dorsal and volar wrist pain.  Mild volar and dorsal wrist pain.  ROM: Wrist extension 60 degrees, wrist flexion 45 degrees, supination 75 degrees.  STRENGTH: Not assessed      See Exercise, Manual, and Modality Logs for complete treatment.       Assessment/Plan  Patient continues to progress well through her rehab and home exercise program.  Still has some mild strength deficits as noted on previous notes.  Wrist flexion range of motion slowly improving.  Wrist extension range of motion progressing nicely now  Progress per Plan of Care and Progress strengthening /stabilization /functional activity           Timed:         Manual Therapy:    20     mins  70234;     Therapeutic Exercise:    23     mins  16853;     Neuromuscular Noel:        mins  23833;    Therapeutic Activity:     16     mins  02584;     Gait Training:           mins  57754;     Ultrasound:          mins  62877;    Ionto                                   mins    91257  Self Care                            mins   49681  Canalith Repos         mins 33728      Un-Timed:  Electrical Stimulation:         mins  16665 ( );  Dry Needling          mins self-pay  Traction          mins 54115      Timed Treatment:   59   mins   Total Treatment:     59   mins    Mark Louis PT, CHT  [place] License: [id number]

## 2024-07-08 ENCOUNTER — TELEPHONE (OUTPATIENT)
Dept: ORTHOPEDICS | Facility: OTHER | Age: 59
End: 2024-07-08
Payer: COMMERCIAL

## 2024-07-15 ENCOUNTER — TREATMENT (OUTPATIENT)
Dept: PHYSICAL THERAPY | Facility: CLINIC | Age: 59
End: 2024-07-15
Payer: COMMERCIAL

## 2024-07-15 DIAGNOSIS — S52.502S CLOSED FRACTURE OF DISTAL END OF LEFT RADIUS, UNSPECIFIED FRACTURE MORPHOLOGY, SEQUELA: Primary | ICD-10-CM

## 2024-07-15 DIAGNOSIS — M25.532 LEFT WRIST PAIN: ICD-10-CM

## 2024-07-15 DIAGNOSIS — Z47.89 ORTHOPEDIC AFTERCARE: ICD-10-CM

## 2024-07-15 DIAGNOSIS — S52.612S CLOSED DISPLACED FRACTURE OF STYLOID PROCESS OF LEFT ULNA, SEQUELA: ICD-10-CM

## 2024-07-15 NOTE — LETTER
Progress Note  7/16/2024  Marline, Brent Jade MD    Re: Mirlande Wiggins  ________________________________________________________________      S:  Mirlande Wiggins states: improved, flexion is still tight.  Less fear about using the hand.     O:  OBSERVATION: no significant edema of hand or wrist.  ROM: Left wrist (degrees):  flex=47, extension=71, RD=22, UD=30, supination 70. Full finger ROM.  STRENGTH:  Left=40 lbs, Right=70 lbs. Lateral Pinch left=12 lbs, right=17 lbs  SENSATION: Distal Phalanx of Index & thumb and radial side of Index finger still abnormal. Rest of hand is normal.  SPECIAL TESTS: Carpal Tunnel test negative.   ACTIVITY TOLERANCE: fine motor skills improving. 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       A: Improvements in ROM, sensation, and strength. Patient is being more consistent with HEP and willingness to use hand in every day activities. Hope to discharge from PT in next 2 visits.    P: Please advise after your exam.    Thank you for this referral.    Mark Louis, PT, CHT

## 2024-07-15 NOTE — PROGRESS NOTES
Physical Therapy Daily Treatment Note  [facility] [place]  [address]  [facility] [address]      Patient: Mirlande Wiggins   : 1965  Diagnosis/ICD-10 Code:  Closed fracture of distal end of left radius, unspecified fracture morphology, sequela [S52.502S]  Referring practitioner: Brent Horan MD  Date of Initial Visit: Type: THERAPY  Noted: 3/20/2024  Today's Date: 7/15/2024  Patient seen for 20 sessions         Visit Diagnoses:    ICD-10-CM ICD-9-CM   1. Closed fracture of distal end of left radius, unspecified fracture morphology, sequela  S52.502S 905.2   2. Closed displaced fracture of styloid process of left ulna, sequela  S52.612S 905.2   3. Left wrist pain  M25.532 719.43   4. Orthopedic aftercare  Z47.89 V54.9       Subjective   Mirlande Wiggins reports: improved, flexion is still tight.  Sensation improving.  Less fear about using the hand.     Objective   OBSERVATION: no significant edema of hand or wrist.  ROM: Left wrist (degrees):  flex=47, extension=71, RD=22, UD=30, supination 70. Full finger ROM.  STRENGTH:  Left=40 lbs, Right=70 lbs. Lateral Pinch left=12 lbs, right=17 lbs  SENSATION: Distal Phalanx of Index and thumb still abnormal, as well as the radial side of Index finger. Rest of hand is normal.  SPECIAL TESTS: Carpal Tunnel test negative.  ACTIVITY TOLERANCE: fine motor skills improving. 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    See Exercise, Manual, and Modality Logs for complete treatment.       Assessment/Plan  Improvements in ROM, sensation, and strength.  Patient is being more consistent with HEP and willingness to use hand in every day activities.  Progress per Plan of Care and Progress strengthening /stabilization /functional activity decrease to 1x every 2 weeks.           Timed:         Manual Therapy:    15     mins  91504;     Therapeutic Exercise:    32     mins  69640;     Neuromuscular Noel:        mins  09721;    Therapeutic  Activity:          mins  44680;     Gait Training:           mins  81442;     Ultrasound:     12     mins  52589;    Ionto                                   mins   13092  Self Care                            mins   01652  Canalith Repos         mins 58646      Un-Timed:  Electrical Stimulation:         mins  38043 ( );  Dry Needling          mins self-pay  Traction          mins 70362      Timed Treatment:   59   mins   Total Treatment:     59   mins    Mark Louis PT, T  [place] License: [id number]

## 2024-07-17 ENCOUNTER — OFFICE VISIT (OUTPATIENT)
Dept: ORTHOPEDIC SURGERY | Facility: CLINIC | Age: 59
End: 2024-07-17
Payer: COMMERCIAL

## 2024-07-17 VITALS
SYSTOLIC BLOOD PRESSURE: 118 MMHG | HEIGHT: 66 IN | DIASTOLIC BLOOD PRESSURE: 70 MMHG | BODY MASS INDEX: 20.73 KG/M2 | WEIGHT: 129 LBS

## 2024-07-17 DIAGNOSIS — Z87.81 STATUS POST OPEN REDUCTION AND INTERNAL FIXATION (ORIF) OF FRACTURE: Primary | ICD-10-CM

## 2024-07-17 DIAGNOSIS — Z98.890 STATUS POST OPEN REDUCTION AND INTERNAL FIXATION (ORIF) OF FRACTURE: Primary | ICD-10-CM

## 2024-07-17 DIAGNOSIS — G56.12 PRONATOR SYNDROME OF LEFT UPPER EXTREMITY: ICD-10-CM

## 2024-07-17 NOTE — PROGRESS NOTES
"                                                                 [facility]     Follow-up Office Visit       Date: 07/17/2024   Patient Name: Mirlande Wiggins  MRN: [id number]  YOB: 1965    Chief Complaint:   Chief Complaint   Patient presents with    Follow-up     6  week follow up--Left carpal tunnel syndrome        History of Present Illness:   Mirlande Wiggins is a 58 y.o. female here for follow-up of left distal radius fracture status post ORIF 4 months ago as well as left carpal tunnel release.  She is been doing well since last visit.  Is been working with therapy and only has 2 visits of physical therapy left.  She reports that the numbness and tingling thumb index and middle finger has almost completely resolved except for the tips.  She does report that she has some left forearm pain that radiates distally into her fingertips.  Patient reports that it also feels like nerve pain.  She is otherwise doing well.  She finds it somewhat difficult to play PNO however she has been playing.  No left wrist pain.  No other concerns.      Subjective   Review of Systems:   Review of Systems     Pertinent review of systems per HPI    I reviewed the patient's chief complaint, history of present illness, review of systems, past medical history, surgical history, family history, social history, medications and allergy list in the EMR on 07/17/2024 and agree with the findings above.    Objective    Vital Signs:   Vitals:    07/17/24 1032   BP: 118/70   Weight: 58.5 kg (129 lb)   Height: 167.6 cm (65.98\")     BMI: Body mass index is 20.83 kg/m².     General Appearance: No acute distress. Alert and oriented.     Chest:  Non-labored breathing on room air      Ortho Exam:  Left distal radius and carpal tunnel incisions are well-healed.   Fingers warm and well-perfused distally  Sensation intact to light touch in the median, radial and ulnar nerve distributions.  2-point discrimination of the thumb " index and middle finger distally <5 mm.  Patient has approximately 50 degrees flexion of the left wrist.  70 degrees extension.  Patient has 70 degrees supination.  90 degrees pronation.  Positive Tinel over the median nerve proximally in the forearm with tenderness over the lacertus fibrosis.  Increasing pain with resisted pronation of the forearm as well as with resisted flexion at the elbow with the elbow extended..    Imaging/Studies:   Imaging Results (Last 24 Hours)       Procedure Component Value Units Date/Time    XR Wrist 3+ View Left [[id number]] Resulted: 07/17/24 1039     Updated: 07/17/24 1040    Narrative:      Left Wrist X-Ray    Indication: s/p ORIF    Views:  AP, Lateral, and Oblique     Comparison: 5/29/24    Findings:  Patient is status post ORIF of the distal radius.  Alignment remains stable.  The fracture appears to be healing appropriately.  New bone is visualized.    The hardware is intact.    Normal soft tissues.  Normal joint spaces      Impression:  X-ray of the left wrist personally reviewed by me.  Fracture   of the left distal radius s/p ORIF with interval healing.              Procedures:  Procedures    Quality Measures:   ACP:   ACP discussion was deferred.    Tobacco:   Mirlande Wiggins  reports that she has never smoked. She has been exposed to tobacco smoke. She has never used smokeless tobacco.    Assessment / Plan    Assessment/Plan:      Diagnosis Plan   1. Status post open reduction and internal fixation (ORIF) of fracture  XR Wrist 3+ View Left    US Nonvascular Extremity Limited      2. Pronator syndrome of left upper extremity            Patient presents now four 4-month status post ORIF of left distal radius fracture as well as carpal tunnel release.  She has almost complete resolution of the median nerve paresthesias in the hand although she does have some evidence of pronator syndrome on exam.  Otherwise she does have excellent range of motion of her left hand and wrist  given her fracture pattern.  Recommend continue physical therapy for left pronator syndrome.  Patient is requesting bilateral upper extremity ultrasound to assess for median nerve cross-sectional area in the forearm as she is a former radiologist.  Recommend bilateral upper extremity ultrasound limited to assess median nerve cross-sectional area.  Follow-up with me in 3 months for repeat check.  If she continues to have symptoms of pronator syndrome at that time we will consider proximal median nerve decompression.    Follow Up:   Return in about 3 months (around 10/17/2024).        Brent Horan MD  [facility] [place] [facility] Upper Extremity Surgeon

## 2024-07-30 ENCOUNTER — TREATMENT (OUTPATIENT)
Dept: PHYSICAL THERAPY | Facility: CLINIC | Age: 59
End: 2024-07-30
Payer: COMMERCIAL

## 2024-07-30 DIAGNOSIS — S52.612S CLOSED DISPLACED FRACTURE OF STYLOID PROCESS OF LEFT ULNA, SEQUELA: ICD-10-CM

## 2024-07-30 DIAGNOSIS — S52.502S CLOSED FRACTURE OF DISTAL END OF LEFT RADIUS, UNSPECIFIED FRACTURE MORPHOLOGY, SEQUELA: Primary | ICD-10-CM

## 2024-07-30 DIAGNOSIS — M25.532 LEFT WRIST PAIN: ICD-10-CM

## 2024-07-30 NOTE — PROGRESS NOTES
Physical Therapy Daily Treatment Note  [facility] THERAP[facility] [place]  [address], [address]  [place] [address]      Patient: Mirlande Wiggins   : 1965  Diagnosis/ICD-10 Code:  Closed fracture of distal end of left radius, unspecified fracture morphology, sequela [S52.502S]  Referring practitioner: Brent Horan MD  Date of Initial Visit: Type: THERAPY  Noted: 3/20/2024  Today's Date: 2024  Patient seen for 21 sessions         Visit Diagnoses:    ICD-10-CM ICD-9-CM   1. Closed fracture of distal end of left radius, unspecified fracture morphology, sequela  S52.502S 905.2   2. Left wrist pain  M25.532 719.43   3. Closed displaced fracture of styloid process of left ulna, sequela  S52.612S 905.2       Subjective   Mirlande Wiggins reports: Stiffness of the wrist is the biggest issue.  Feeling some stronger although still feels weak.  At times her wrist feels loose other times it feels stiff.  She can see an improvement compared to 1 to 2 months ago.    Objective          Active Range of Motion     Left Elbow   Forearm supination: 68 degrees   Forearm pronation: 90 degrees     Passive Range of Motion     Left Wrist   Wrist flexion: 60 degrees   Wrist extension: 78 degrees   Radial deviation: WFL  Ulnar deviation: WFL    Strength/Myotome Testing     Left Wrist/Hand      (2nd hand position)   Left  strength (2nd hand position) 45 lbs    Right Wrist/Hand      (2nd hand position)   Right  strength (2nd hand position) 70 lbs      See Exercise, Manual, and Modality Logs for complete treatment.       Assessment/Plan  Patient is independent with all home exercise programs.  She has been much more consistent with following through with her exercises and giving good effort to try to maintain and improve her range of motion.  Strength has improved.  There are still deficit in strength but I think if she continues with her home exercise program she will continue to improve.  She has  returned to normal daily activities including playing the piano.  Her fear avoidance is significantly less now.  Goals been met.  No longer needs skilled physical therapy.  Other discharging patient from physical therapy.           Timed:         Manual Therapy:    20     mins  38441;     Therapeutic Exercise:    24     mins  49743;     Neuromuscular Noel:        mins  71715;    Therapeutic Activity:     14     mins  75089;     Gait Training:           mins  73555;     Ultrasound:          mins  99501;    Ionto                                   mins   43974  Self Care                            mins   96727  Canalith Repos         mins [id number]      Un-Timed:  Electrical Stimulation:         mins  39745 ( );  Dry Needling          mins self-pay  Traction          mins 47007      Timed Treatment:   58   mins   Total Treatment:     58   mins    Mark Louis, PT  [place] License: [id number]

## 2024-10-23 ENCOUNTER — OFFICE VISIT (OUTPATIENT)
Dept: ORTHOPEDIC SURGERY | Facility: CLINIC | Age: 59
End: 2024-10-23
Payer: COMMERCIAL

## 2024-10-23 VITALS
HEIGHT: 66 IN | BODY MASS INDEX: 22.02 KG/M2 | WEIGHT: 137 LBS | SYSTOLIC BLOOD PRESSURE: 120 MMHG | DIASTOLIC BLOOD PRESSURE: 70 MMHG

## 2024-10-23 DIAGNOSIS — Z98.890 STATUS POST OPEN REDUCTION AND INTERNAL FIXATION (ORIF) OF FRACTURE: ICD-10-CM

## 2024-10-23 DIAGNOSIS — Z87.81 STATUS POST OPEN REDUCTION AND INTERNAL FIXATION (ORIF) OF FRACTURE: ICD-10-CM

## 2024-10-23 DIAGNOSIS — M79.646 PAIN OF FINGER, UNSPECIFIED LATERALITY: Primary | ICD-10-CM

## 2024-10-23 RX ORDER — FLUOXETINE 10 MG/1
CAPSULE ORAL
COMMUNITY
Start: 2024-09-05

## 2024-10-23 NOTE — PROGRESS NOTES
[facility]     Follow-up Office Visit       Date: 10/23/2024   Patient Name: Mirlande Wiggins  MRN: [id number]  YOB: 1965    Chief Complaint:   Chief Complaint   Patient presents with    Follow-up     3 month f/u-status post Left carpal tunnel syndrome - 3/12/25  &  Left Open reduction internal fixation of intra-articular distal radius fracture > 3 pieces - 3/6/24       History of Present Illness:   Mirlande Wiggins is a 59 y.o. female presents for follow-up of left distal radius fracture and left carpal tunnel release performed in March 2024.  She been doing well since the last visit.  Reports she only has numbness at the very tip of her index finger and reports that it is only occasional.  Reports she does have some left wrist stiffness but no pain.  He is also noticed an area of swelling over the dorsal aspect of the left ring finger that is painful if she hits it.  Reports it is stable in size.      Subjective   Review of Systems:   Review of Systems   Constitutional:  Negative for chills, fever, unexpected weight gain and unexpected weight loss.   HENT:  Negative for congestion, postnasal drip and rhinorrhea.    Eyes:  Negative for blurred vision.   Respiratory:  Negative for shortness of breath.    Cardiovascular:  Negative for leg swelling.   Gastrointestinal:  Negative for abdominal pain, nausea and vomiting.   Genitourinary:  Negative for difficulty urinating.   Musculoskeletal:  Positive for arthralgias. Negative for gait problem, joint swelling and myalgias.   Skin:  Negative for skin lesions and wound.   Neurological:  Negative for dizziness, weakness, light-headedness and numbness.   Hematological:  Does not bruise/bleed easily.   Psychiatric/Behavioral:  Negative for depressed mood.         Pertinent review of systems per HPI    I reviewed the patient's chief complaint, history of present illness, review of  "systems, past medical history, surgical history, family history, social history, medications and allergy list in the EMR on 10/23/2024 and agree with the findings above.    Objective    Vital Signs:   Vitals:    10/23/24 0942   BP: 120/70   Weight: 62.1 kg (137 lb)   Height: 167.6 cm (65.98\")     BMI: Body mass index is 22.12 kg/m².     General Appearance: No acute distress. Alert and oriented.     Chest:  Non-labored breathing on room air      Ortho Exam:  Left distal radius and carpal tunnel incisions are well-healed.  Patient has full flexion of the left wrist compared to the contralateral side.  Has approximately 10 degrees left extension.  Nontender to palpation throughout the wrist.  There is a mucous cyst over the left ring finger DIP noticed only mildly tender to palpation.  Fingers warm and well-perfused distally  Sensation intact to light touch in the median, radial and ulnar nerve distributions    Imaging/Studies:   Imaging Results (Last 24 Hours)       Procedure Component Value Units Date/Time    XR Finger 2+ View Left [[id number]] Resulted: 10/23/24 1018     Updated: 10/23/24 1018    Narrative:      Left Finger X-Ray    Indication: Pain    Views:  AP, Lateral, and Oblique     Comparison:  None    Findings:  Small osteophyte associated with the ulnar aspect of the left ring finger   DIP.  History open reduction internal fixation of left distal radius fracture   with well-healed fracture.  Normal soft tissues  Normal joint spaces    Impression: Left ring finger osteophyte at the DIP consistent with mucous   cyst.  Status post ORIF of left distal radius fracture with intact   hardware and healed fracture.                Procedures:  Procedures    Quality Measures:   ACP:   ACP discussion was deferred.    Tobacco:   Mirlande Wiggins  reports that she has never smoked. She has been exposed to tobacco smoke. She has never used smokeless tobacco.    Assessment / Plan    Assessment/Plan:      Diagnosis Plan   1. " Pain of finger, unspecified laterality  XR Finger 2+ View Left      2. Status post open reduction and internal fixation (ORIF) of fracture            Patient presents for 7-month follow-up status post left distal radius ORIF and left carpal tunnel release.  She is doing well since her last visit.  Is resolution of her median nerve neuropraxia and full use of the left hand and wrist.  Recommend continue home exercise program.  She also has evidence of a left ring finger mucous cyst.  We discussed her diagnosis as well as the options for treatment.  Recommend continued observation at this time.  Follow-up with me in 6 months for repeat check.    Follow Up:   No follow-ups on file.        Brent Horan MD  [facility] Upper Extremity Surgeon